# Patient Record
Sex: MALE | Race: WHITE | NOT HISPANIC OR LATINO | URBAN - METROPOLITAN AREA
[De-identification: names, ages, dates, MRNs, and addresses within clinical notes are randomized per-mention and may not be internally consistent; named-entity substitution may affect disease eponyms.]

---

## 2018-11-01 ENCOUNTER — OFFICE (AMBULATORY)
Dept: URBAN - METROPOLITAN AREA PATHOLOGY 4 | Facility: PATHOLOGY | Age: 53
End: 2018-11-01
Payer: COMMERCIAL

## 2018-11-01 ENCOUNTER — ON CAMPUS - OUTPATIENT (AMBULATORY)
Dept: URBAN - METROPOLITAN AREA HOSPITAL 2 | Facility: HOSPITAL | Age: 53
End: 2018-11-01
Payer: COMMERCIAL

## 2018-11-01 VITALS
RESPIRATION RATE: 15 BRPM | HEART RATE: 84 BPM | DIASTOLIC BLOOD PRESSURE: 72 MMHG | HEART RATE: 79 BPM | DIASTOLIC BLOOD PRESSURE: 68 MMHG | SYSTOLIC BLOOD PRESSURE: 131 MMHG | DIASTOLIC BLOOD PRESSURE: 64 MMHG | HEART RATE: 86 BPM | HEART RATE: 83 BPM | WEIGHT: 205 LBS | DIASTOLIC BLOOD PRESSURE: 67 MMHG | DIASTOLIC BLOOD PRESSURE: 84 MMHG | RESPIRATION RATE: 16 BRPM | DIASTOLIC BLOOD PRESSURE: 57 MMHG | TEMPERATURE: 97.2 F | HEART RATE: 77 BPM | HEIGHT: 69 IN | RESPIRATION RATE: 18 BRPM | OXYGEN SATURATION: 94 % | HEART RATE: 90 BPM | SYSTOLIC BLOOD PRESSURE: 104 MMHG | SYSTOLIC BLOOD PRESSURE: 94 MMHG | DIASTOLIC BLOOD PRESSURE: 62 MMHG | RESPIRATION RATE: 20 BRPM | DIASTOLIC BLOOD PRESSURE: 83 MMHG | RESPIRATION RATE: 12 BRPM | DIASTOLIC BLOOD PRESSURE: 104 MMHG | OXYGEN SATURATION: 100 % | OXYGEN SATURATION: 97 % | SYSTOLIC BLOOD PRESSURE: 98 MMHG | HEART RATE: 80 BPM | SYSTOLIC BLOOD PRESSURE: 150 MMHG | SYSTOLIC BLOOD PRESSURE: 105 MMHG | HEART RATE: 93 BPM | SYSTOLIC BLOOD PRESSURE: 108 MMHG | OXYGEN SATURATION: 99 % | DIASTOLIC BLOOD PRESSURE: 66 MMHG | HEART RATE: 82 BPM | SYSTOLIC BLOOD PRESSURE: 110 MMHG | OXYGEN SATURATION: 95 %

## 2018-11-01 DIAGNOSIS — K62.1 RECTAL POLYP: ICD-10-CM

## 2018-11-01 DIAGNOSIS — D12.0 BENIGN NEOPLASM OF CECUM: ICD-10-CM

## 2018-11-01 DIAGNOSIS — Z12.11 ENCOUNTER FOR SCREENING FOR MALIGNANT NEOPLASM OF COLON: ICD-10-CM

## 2018-11-01 DIAGNOSIS — K64.8 OTHER HEMORRHOIDS: ICD-10-CM

## 2018-11-01 DIAGNOSIS — K57.30 DIVERTICULOSIS OF LARGE INTESTINE WITHOUT PERFORATION OR ABS: ICD-10-CM

## 2018-11-01 LAB
GI HISTOLOGY: A. UNSPECIFIED: (no result)
GI HISTOLOGY: B. UNSPECIFIED: (no result)
GI HISTOLOGY: PDF REPORT: (no result)

## 2018-11-01 PROCEDURE — 45380 COLONOSCOPY AND BIOPSY: CPT | Mod: PT | Performed by: INTERNAL MEDICINE

## 2018-11-01 PROCEDURE — 88305 TISSUE EXAM BY PATHOLOGIST: CPT | Mod: 26 | Performed by: INTERNAL MEDICINE

## 2021-07-27 ENCOUNTER — APPOINTMENT (OUTPATIENT)
Dept: GENERAL RADIOLOGY | Facility: HOSPITAL | Age: 56
End: 2021-07-27

## 2021-07-27 ENCOUNTER — HOSPITAL ENCOUNTER (EMERGENCY)
Facility: HOSPITAL | Age: 56
Discharge: HOME | End: 2021-07-27
Attending: EMERGENCY MEDICINE | Admitting: EMERGENCY MEDICINE

## 2021-07-27 VITALS
DIASTOLIC BLOOD PRESSURE: 89 MMHG | HEART RATE: 78 BPM | HEIGHT: 69 IN | TEMPERATURE: 97.9 F | BODY MASS INDEX: 29.03 KG/M2 | SYSTOLIC BLOOD PRESSURE: 148 MMHG | WEIGHT: 196 LBS | OXYGEN SATURATION: 99 % | RESPIRATION RATE: 16 BRPM
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
## 2021-07-27 DIAGNOSIS — S80.12XA CONTUSION OF LEFT LOWER LEG, INITIAL ENCOUNTER: ICD-10-CM

## 2021-07-27 DIAGNOSIS — S40.012A CONTUSION OF LEFT SHOULDER, INITIAL ENCOUNTER: ICD-10-CM

## 2021-07-27 PROCEDURE — 99282 EMERGENCY DEPT VISIT SF MDM: CPT

## 2021-07-27 PROCEDURE — 73010 X-RAY EXAM OF SHOULDER BLADE: CPT

## 2021-07-27 PROCEDURE — 73590 X-RAY EXAM OF LOWER LEG: CPT

## 2021-09-12 ENCOUNTER — INPATIENT HOSPITAL (AMBULATORY)
Dept: URBAN - METROPOLITAN AREA HOSPITAL 76 | Facility: HOSPITAL | Age: 56
End: 2021-09-12

## 2021-09-12 DIAGNOSIS — K21.9 GASTRO-ESOPHAGEAL REFLUX DISEASE WITHOUT ESOPHAGITIS: ICD-10-CM

## 2021-09-12 DIAGNOSIS — R93.3 ABNORMAL FINDINGS ON DIAGNOSTIC IMAGING OF OTHER PARTS OF DI: ICD-10-CM

## 2021-09-12 DIAGNOSIS — R79.89 OTHER SPECIFIED ABNORMAL FINDINGS OF BLOOD CHEMISTRY: ICD-10-CM

## 2021-09-12 PROCEDURE — 99222 1ST HOSP IP/OBS MODERATE 55: CPT | Performed by: INTERNAL MEDICINE

## 2021-09-13 ENCOUNTER — INPATIENT HOSPITAL (AMBULATORY)
Dept: URBAN - METROPOLITAN AREA HOSPITAL 76 | Facility: HOSPITAL | Age: 56
End: 2021-09-13

## 2021-09-13 DIAGNOSIS — Z86.010 PERSONAL HISTORY OF COLONIC POLYPS: ICD-10-CM

## 2021-09-13 DIAGNOSIS — R07.9 CHEST PAIN, UNSPECIFIED: ICD-10-CM

## 2021-09-13 DIAGNOSIS — R93.3 ABNORMAL FINDINGS ON DIAGNOSTIC IMAGING OF OTHER PARTS OF DI: ICD-10-CM

## 2021-09-13 PROCEDURE — 99232 SBSQ HOSP IP/OBS MODERATE 35: CPT | Performed by: NURSE PRACTITIONER

## 2023-11-20 ENCOUNTER — OFFICE (AMBULATORY)
Dept: URBAN - METROPOLITAN AREA CLINIC 64 | Facility: CLINIC | Age: 58
End: 2023-11-20
Payer: MEDICAID

## 2023-11-20 VITALS
HEIGHT: 69 IN | DIASTOLIC BLOOD PRESSURE: 90 MMHG | HEART RATE: 70 BPM | WEIGHT: 177.2 LBS | SYSTOLIC BLOOD PRESSURE: 152 MMHG

## 2023-11-20 DIAGNOSIS — R93.3 ABNORMAL FINDINGS ON DIAGNOSTIC IMAGING OF OTHER PARTS OF DI: ICD-10-CM

## 2023-11-20 DIAGNOSIS — R63.4 ABNORMAL WEIGHT LOSS: ICD-10-CM

## 2023-11-20 DIAGNOSIS — K59.00 CONSTIPATION, UNSPECIFIED: ICD-10-CM

## 2023-11-20 DIAGNOSIS — R63.0 ANOREXIA: ICD-10-CM

## 2023-11-20 DIAGNOSIS — R13.10 DYSPHAGIA, UNSPECIFIED: ICD-10-CM

## 2023-11-20 DIAGNOSIS — Z86.010 PERSONAL HISTORY OF COLONIC POLYPS: ICD-10-CM

## 2023-11-20 DIAGNOSIS — Z79.02 LONG TERM (CURRENT) USE OF ANTITHROMBOTICS/ANTIPLATELETS: ICD-10-CM

## 2023-11-20 PROCEDURE — 99214 OFFICE O/P EST MOD 30 MIN: CPT | Performed by: NURSE PRACTITIONER

## 2024-01-08 ENCOUNTER — ON CAMPUS - OUTPATIENT (AMBULATORY)
Dept: URBAN - METROPOLITAN AREA HOSPITAL 77 | Facility: HOSPITAL | Age: 59
End: 2024-01-08
Payer: MEDICAID

## 2024-01-08 DIAGNOSIS — Z86.010 PERSONAL HISTORY OF COLONIC POLYPS: ICD-10-CM

## 2024-01-08 DIAGNOSIS — R63.4 ABNORMAL WEIGHT LOSS: ICD-10-CM

## 2024-01-08 DIAGNOSIS — K29.50 UNSPECIFIED CHRONIC GASTRITIS WITHOUT BLEEDING: ICD-10-CM

## 2024-01-08 DIAGNOSIS — K63.5 POLYP OF COLON: ICD-10-CM

## 2024-01-08 DIAGNOSIS — R13.10 DYSPHAGIA, UNSPECIFIED: ICD-10-CM

## 2024-01-08 DIAGNOSIS — R93.3 ABNORMAL FINDINGS ON DIAGNOSTIC IMAGING OF OTHER PARTS OF DI: ICD-10-CM

## 2024-01-08 PROCEDURE — 43239 EGD BIOPSY SINGLE/MULTIPLE: CPT | Performed by: INTERNAL MEDICINE

## 2024-01-08 PROCEDURE — 43450 DILATE ESOPHAGUS 1/MULT PASS: CPT | Performed by: INTERNAL MEDICINE

## 2024-01-08 PROCEDURE — 45385 COLONOSCOPY W/LESION REMOVAL: CPT | Performed by: INTERNAL MEDICINE

## 2024-02-20 ENCOUNTER — OFFICE (AMBULATORY)
Dept: URBAN - METROPOLITAN AREA CLINIC 64 | Facility: CLINIC | Age: 59
End: 2024-02-20
Payer: MEDICAID

## 2024-02-20 VITALS
WEIGHT: 186 LBS | DIASTOLIC BLOOD PRESSURE: 79 MMHG | HEIGHT: 69 IN | HEART RATE: 52 BPM | SYSTOLIC BLOOD PRESSURE: 133 MMHG

## 2024-02-20 DIAGNOSIS — R13.10 DYSPHAGIA, UNSPECIFIED: ICD-10-CM

## 2024-02-20 DIAGNOSIS — R63.0 ANOREXIA: ICD-10-CM

## 2024-02-20 PROCEDURE — 99212 OFFICE O/P EST SF 10 MIN: CPT | Performed by: NURSE PRACTITIONER

## 2025-01-08 NOTE — PROGRESS NOTES
Chief Complaint  Memory Loss    Subjective            Kamar Scott presents to [facility] NEUROLOGY for   History of Present Illness    Kamar Scott is a 59-year-old male seen today as a new patient for memory decline, referred by Dr. Lafleur.  He is accompanied by his wife, who provides primary history.  The patient has a baseline of learning disability.  He finished the 10th grade but never learned to read and write beyond a second grade level (per wife).  She reports that he started forgetting small things around the beginning of , but they noticed a significant change in cognition in 2023.  He started forgetting more important things like his name and he became scared to drive.  He saw Dr. Juni Pabon and 2024 who did lab work and started him on donepezil 5 mg daily.  The donepezil seemed to improve his recall and short-term memory slightly.      She does most of the cooking, but he will cook things in the microwave.  He has forgotten to turn off the oven in the past.  He is not driving.  Patient had 1 recent fall due to tripping over their dog.    He has a history of type 2 diabetes; last A1c 6.1%.  He was taken off all of his diabetes medication due to a decrease in kidney function.  He sees a nephrologist.  The patient has lost a significant amount of weight and his type 2 diabetes is more diet controlled.  He has a history of heart surgery at the age of 9, atrial fibrillation, and heart attack with stent placement.    His father had early onset dementia (approximately starting around the same age) and  in his early 60s.        Onset: Early  and getting worse, especially worse around 2023  Medication:donepezil 5 mg-started this medication   Examples: He can't drive or write his name  Family History: Father with early onset dementia,  in his early 60s  Triggers: stress  Example: forgetting places, he use to hunt but unable to do that anymore  Forgets  "what he went to room for        Maximum   Score Patient's   Score Questions   5 3 \"What is the year?Season?Date?Day of the week?Month?\"   5 4 \"Where are we now: State?County?Town/city?Hospital?Floor?\"   3 3 3 Unrelated objects Number of trials:___   5 0 Count backward from 100 by sevens or spell WORLD backwards   3 0 Name 3 things from above   2 2 Identify 2 objects   1 0 Repeat the phrase: No ifs, ands,or buts.   3 0 Take paper in right hand, fold it in half, and put it on the floor.   1 0 Please read this and do what it says. \"Close your eyes\"   1 0 Make up and write a sentence about anything. Noun and verb   1 0 Copy this picture 10 angles must be present.   30 12 Total MMSE                   Family History   Problem Relation Age of Onset    Neuropathy Father     Dementia Father        Past Medical History:   Diagnosis Date    Hypertension     Peripheral neuropathy        Social History     Socioeconomic History    Marital status:    Tobacco Use    Smoking status: Every Day     Types: Cigarettes   Vaping Use    Vaping status: Never Used   Substance and Sexual Activity    Alcohol use: Not Currently    Drug use: Yes     Types: Marijuana    Sexual activity: Defer         Current Outpatient Medications:     aspirin 81 MG EC tablet, Take 1 tablet by mouth Daily., Disp: , Rfl:     atorvastatin (LIPITOR) 10 MG tablet, Take 1 tablet by mouth Daily., Disp: , Rfl:     clopidogrel (PLAVIX) 75 MG tablet, Take 1 tablet by mouth Daily., Disp: , Rfl:     cyclobenzaprine (FLEXERIL) 10 MG tablet, Take 1 tablet by mouth Daily., Disp: , Rfl:     dilTIAZem CD (CARDIZEM CD) 300 MG 24 hr capsule, Take 1 capsule by mouth every night at bedtime., Disp: , Rfl:     donepezil (ARICEPT) 10 MG tablet, Take 0.5 tablets by mouth Every Night., Disp: 30 tablet, Rfl: 5    DULoxetine (CYMBALTA) 60 MG capsule, Take 1 capsule by mouth Daily., Disp: , Rfl:     gabapentin (NEURONTIN) 300 MG capsule, Take 1 capsule by mouth 2 (Two) Times a Day., " "Disp: , Rfl:     gemfibrozil (LOPID) 600 MG tablet, Take 1 tablet by mouth 2 (Two) Times a Day., Disp: , Rfl:     HYDROcodone-acetaminophen (NORCO)  MG per tablet, TAKE 1 TABLET BY MOUTH EVERY 6 HOURS AS NEEDED FOR 30 DAYS, Disp: , Rfl:     lisinopril (PRINIVIL,ZESTRIL) 2.5 MG tablet, Take 1 tablet by mouth Daily., Disp: , Rfl:     metoprolol succinate XL (TOPROL-XL) 100 MG 24 hr tablet, Take 1 tablet by mouth 2 (Two) Times a Day., Disp: , Rfl:     multivitamin with minerals tablet tablet, Take 1 tablet by mouth Daily., Disp: , Rfl:     tamsulosin (FLOMAX) 0.4 MG capsule 24 hr capsule, Take 1 capsule by mouth 2 (Two) Times a Day., Disp: , Rfl:     melatonin 5 MG sublingual tablet sublingual tablet, Place 4 tablets under the tongue., Disp: , Rfl:     Review of Systems   Genitourinary:  Positive for frequency.   Musculoskeletal:  Positive for neck pain and neck stiffness.   Neurological:  Positive for tremors and light-headedness.   Psychiatric/Behavioral:  Positive for agitation and confusion. The patient is nervous/anxious and is hyperactive.    All other systems reviewed and are negative.           Objective   Vital Signs:   /78   Pulse 56   Ht 175.3 cm (69\")   Wt 83.5 kg (184 lb)   BMI 27.17 kg/m²     Physical Exam  Vitals reviewed.   Constitutional:       General: He is awake.      Appearance: He is well-developed and overweight.   HENT:      Head: Normocephalic and atraumatic.   Eyes:      General: Lids are normal.      Extraocular Movements: Extraocular movements intact.      Pupils: Pupils are equal, round, and reactive to light.   Neck:      Vascular: No carotid bruit.   Cardiovascular:      Rate and Rhythm: Normal rate.      Heart sounds: No murmur heard.  Pulmonary:      Effort: Pulmonary effort is normal.   Musculoskeletal:      Cervical back: Normal range of motion and neck supple.   Skin:     General: Skin is warm and dry.      Findings: No rash.   Neurological:      Mental Status: He is " alert and oriented to person, place, and time.      Cranial Nerves: Cranial nerves 2-12 are intact. No cranial nerve deficit.      Sensory: No sensory deficit.      Motor: Motor function is intact. Weakness present. No tremor, atrophy or abnormal muscle tone.      Coordination: Coordination is intact. Finger-Nose-Finger Test normal. Rapid alternating movements normal.      Deep Tendon Reflexes: Reflexes are normal and symmetric.      Reflex Scores:       Tricep reflexes are 2+ on the right side and 2+ on the left side.       Bicep reflexes are 2+ on the right side and 2+ on the left side.       Brachioradialis reflexes are 2+ on the right side and 2+ on the left side.       Patellar reflexes are 2+ on the right side and 2+ on the left side.     Comments: Weakness right shoulder abduction (shoulder surgery x 2)   Psychiatric:         Attention and Perception: Attention normal.         Mood and Affect: Mood normal.         Speech: Speech normal.         Behavior: Behavior normal.         Cognition and Memory: Cognition and memory normal.         Judgment: Judgment normal.        Result Review :              CT HEAD W/O 1-  IMPRESSION:  No acute intracranial process identified.   Electronically Signed: Scooter Isidro MD      Neurological Exam  Mental Status  Awake and alert. Oriented only to person. Oriented to person, place, and time. Memory is normal. Speech is normal. MMSE score: 12.  Patient has baseline learning disabilities, pleated the 10th grade, unable to read or write above second grade level per wife.    Cranial Nerves  CN II: Visual acuity is normal. Visual fields full to confrontation.  CN III, IV, VI: Extraocular movements intact bilaterally. Normal lids and orbits bilaterally. Pupils equal round and reactive to light bilaterally.  CN V: Facial sensation is normal.  CN VII: Full and symmetric facial movement.  CN IX, X: Palate elevates symmetrically. Normal gag reflex.  CN XI: Shoulder shrug strength  is normal.  CN XII: Tongue midline without atrophy or fasciculations.    Motor   Normal muscle tone. No abnormal involuntary movements.                                               Right                     Left   Shoulder abduction               5                          5  Elbow flexion                         5                          5  Elbow extension                    5                          5  Hip flexion                              5                          5  Knee extension                      5                          5    Sensory  Light touch is normal in upper and lower extremities.     Reflexes  Deep tendon reflexes are 2+ and symmetric in all four extremities.                                            Right                      Left  Brachioradialis                    2+                         2+  Biceps                                 2+                         2+  Triceps                                2+                         2+  Patellar                                2+                         2+    Coordination    Finger-to-nose, rapid alternating movements and heel-to-shin normal bilaterally without dysmetria.No tremor    Gait  Casual gait is normal including stance, stride, and arm swing. Able to rise from chair without using arms.              Assessment and Plan    Diagnoses and all orders for this visit:    1. 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  Dispense: 30 tablet; Refill: 5  -     Cancel: IR Lumbar Puncture Diagnosis; Future  -     Obtain Informed Consent; Standing  -     Notify Provider if Patient Taking Blood Thinning Agents; Standing  -     Check & Record Opening & Closing Pressures (LP); Standing  -     CBC (No Diff); Standing  -     Glucose, CSF - Cerebrospinal Fluid, Lumbar Puncture; Standing  -     Protein, CSF - Cerebrospinal Fluid, Lumbar Puncture; Standing  -     Cell Count With Differential, CSF Use CSF Tube: 1; Standing  -     Cell Count With Differential, CSF; Standing  -     CSF Tube - Cerebrospinal Fluid, Lumbar Puncture; Standing  -     CSF Tube - Cerebrospinal Fluid, Lumbar Puncture; Standing  -     Beta Amyloid 42/40 Ratio, CSF (Sarstedt tube) - Cerebrospinal Fluid, Lumbar Puncture; Standing  -     Mika Cunningham Virus DNA, PCR CSF - Cerebrospinal Fluid, Lumbar Puncture; Standing  -     CSF phoshorylated ; - Miscellaneous Test; Standing  -     Non-gynecologic Cytology; Standing  -     Flow Cytometry; Standing  -     AB42/40 plasma - Miscellaneous Test; Future  -     Creutzfeldt-Anthony Disease (Collect Sarstedt tube) - Cerebrospinal Fluid, Lumbar Puncture; Future  -     IR Lumbar Puncture Diagnosis; Future    2. Memory loss  -     HIV-1 & HIV-2 Antibodies; Future  -     Vitamin B12 & Folate  -     Copper, Serum; Future  -     Heavy Metals, Blood  -     TSH+Free T4  -     Lyme Disease Total Antibody With Reflex to Immunoassay; Future  -     Vitamin E; Future  -     Ceruloplasmin; Future  -     EV by IFA, Reflex 9-biomarkers profile; Future  -     MRI Brain With & Without Contrast; Future  -     Sjogren's Antibody, Anti-SS-A / -SS-B; Future  -     Cancel: IR Lumbar Puncture Diagnosis; Future  -     Obtain Informed Consent; Standing  -     Notify Provider if Patient Taking Blood Thinning Agents; Standing  -     Check & Record Opening & Closing Pressures (LP); Standing  -      CBC (No Diff); Standing  -     Glucose, CSF - Cerebrospinal Fluid, Lumbar Puncture; Standing  -     Protein, CSF - Cerebrospinal Fluid, Lumbar Puncture; Standing  -     Cell Count With Differential, CSF Use CSF Tube: 1; Standing  -     Cell Count With Differential, CSF; Standing  -     CSF Tube - Cerebrospinal Fluid, Lumbar Puncture; Standing  -     CSF Tube - Cerebrospinal Fluid, Lumbar Puncture; Standing  -     Beta Amyloid 42/40 Ratio, CSF (Sarstedt tube) - Cerebrospinal Fluid, Lumbar Puncture; Standing  -     Mika Cunningham Virus DNA, PCR CSF - Cerebrospinal Fluid, Lumbar Puncture; Standing  -     CSF phoshorylated ; - Miscellaneous Test; Standing  -     Non-gynecologic Cytology; Standing  -     Flow Cytometry; Standing  -     AB42/40 plasma - Miscellaneous Test; Future  -     IR Lumbar Puncture Diagnosis; Future      Kamar Scott is seen today as a new patient for memory decline.  Symptoms started about a year and a half ago and had significantly declined since las summer.  There was a noticeable improvement in memory and recall when starting donepezil 5 mg daily.  He had a CT of his head 1/20/2024 which was unremarkable.  Lab testing completed through another neurology office indicated low normal B12 level otherwise levels WNL.  MMSE today was 12/30 down from 18/30 in June.  Testing is inconsistent due to baseline cognitive function.    We discussed environmental versus genetic risk factors for early onset dementia/Alzheimer's.  He has history of type 2 diabetes, high cholesterol, and hypertension.    Will check MRI brain with and without contrast    Additional labs for causes or contributing factors of memory and cognitive decline    We discussed getting a lumbar puncture for rapid progression of early onset dementia.  The patient and his wife are interested in pursuing this.  We can check AB42/40 along with additional testing including for CJD    Increase donepezil to 10 mg daily    Follow-up  after testing      I spent 96 minutes caring for Kamar on this date of service. This time includes time spent by me in the following activities:preparing for the visit, reviewing tests, obtaining and/or reviewing a separately obtained history, performing a medically appropriate examination and/or evaluation , counseling and educating the patient/family/caregiver, ordering medications, tests, or procedures, and documenting information in the medical record  Follow Up   Return for Next scheduled follow up afer testing.  Patient was given instructions and counseling regarding his condition or for health maintenance advice. Please see specific information pulled into the AVS if appropriate.         This document has been electronically signed by ALF Gonzales on January 9, 2025 16:39 EST

## 2025-01-09 ENCOUNTER — OFFICE VISIT (OUTPATIENT)
Dept: NEUROLOGY | Facility: CLINIC | Age: 60
End: 2025-01-09
Payer: MEDICARE

## 2025-01-09 VITALS
BODY MASS INDEX: 27.25 KG/M2 | SYSTOLIC BLOOD PRESSURE: 130 MMHG | DIASTOLIC BLOOD PRESSURE: 78 MMHG | HEIGHT: 69 IN | HEART RATE: 56 BPM | WEIGHT: 184 LBS

## 2025-01-09 DIAGNOSIS — R41.3 MEMORY LOSS: ICD-10-CM

## 2025-01-09 DIAGNOSIS — F02.B0 MODERATE EARLY ONSET ALZHEIMER'S DEMENTIA WITHOUT BEHAVIORAL DISTURBANCE, PSYCHOTIC DISTURBANCE, MOOD DISTURBANCE, OR ANXIETY: Primary | ICD-10-CM

## 2025-01-09 DIAGNOSIS — G30.0 MODERATE EARLY ONSET ALZHEIMER'S DEMENTIA WITHOUT BEHAVIORAL DISTURBANCE, PSYCHOTIC DISTURBANCE, MOOD DISTURBANCE, OR ANXIETY: Primary | ICD-10-CM

## 2025-01-09 RX ORDER — DONEPEZIL HYDROCHLORIDE 5 MG/1
5 TABLET, FILM COATED ORAL NIGHTLY
COMMUNITY
End: 2025-01-09 | Stop reason: SDUPTHER

## 2025-01-09 RX ORDER — GEMFIBROZIL 600 MG/1
600 TABLET, FILM COATED ORAL 2 TIMES DAILY
COMMUNITY

## 2025-01-09 RX ORDER — CLOPIDOGREL BISULFATE 75 MG/1
75 TABLET ORAL DAILY
COMMUNITY

## 2025-01-09 RX ORDER — HYDROCODONE BITARTRATE AND ACETAMINOPHEN 10; 325 MG/1; MG/1
TABLET ORAL
COMMUNITY

## 2025-01-09 RX ORDER — CYCLOBENZAPRINE HCL 10 MG
10 TABLET ORAL DAILY
COMMUNITY

## 2025-01-09 RX ORDER — ACETAMINOPHEN 160 MG
TABLET,DISINTEGRATING ORAL
COMMUNITY
Start: 2015-06-01 | End: 2025-01-09

## 2025-01-09 RX ORDER — ATENOLOL 25 MG/1
TABLET ORAL
COMMUNITY
Start: 2014-04-18 | End: 2025-01-09

## 2025-01-09 RX ORDER — METOPROLOL SUCCINATE 100 MG/1
100 TABLET, EXTENDED RELEASE ORAL 2 TIMES DAILY
COMMUNITY

## 2025-01-09 RX ORDER — DULOXETIN HYDROCHLORIDE 60 MG/1
60 CAPSULE, DELAYED RELEASE ORAL DAILY
COMMUNITY

## 2025-01-09 RX ORDER — DILTIAZEM HYDROCHLORIDE 300 MG/1
300 CAPSULE, COATED, EXTENDED RELEASE ORAL
COMMUNITY
Start: 2024-10-16

## 2025-01-09 RX ORDER — MELOXICAM 15 MG/1
1 TABLET ORAL DAILY
COMMUNITY
End: 2025-01-09

## 2025-01-09 RX ORDER — TAMSULOSIN HYDROCHLORIDE 0.4 MG/1
1 CAPSULE ORAL 2 TIMES DAILY
COMMUNITY

## 2025-01-09 RX ORDER — GLIPIZIDE 10 MG/1
TABLET ORAL
COMMUNITY
End: 2025-01-09

## 2025-01-09 RX ORDER — GABAPENTIN 300 MG/1
300 CAPSULE ORAL 2 TIMES DAILY
COMMUNITY

## 2025-01-09 RX ORDER — LISINOPRIL 2.5 MG/1
2.5 TABLET ORAL DAILY
COMMUNITY

## 2025-01-09 RX ORDER — ASPIRIN 81 MG/1
1 TABLET ORAL DAILY
COMMUNITY
Start: 2014-04-18

## 2025-01-09 RX ORDER — LISINOPRIL AND HYDROCHLOROTHIAZIDE 20; 25 MG/1; MG/1
TABLET ORAL
COMMUNITY
Start: 2014-04-18 | End: 2025-01-09

## 2025-01-09 RX ORDER — CITALOPRAM HYDROBROMIDE 40 MG/1
TABLET ORAL
COMMUNITY
Start: 2015-04-28 | End: 2025-01-09

## 2025-01-09 RX ORDER — MULTIVIT-MIN/IRON FUM/FOLIC AC 7.5 MG-4
1 TABLET ORAL DAILY
COMMUNITY

## 2025-01-09 RX ORDER — ATORVASTATIN CALCIUM 10 MG/1
1 TABLET, FILM COATED ORAL DAILY
COMMUNITY

## 2025-01-09 RX ORDER — DONEPEZIL HYDROCHLORIDE 10 MG/1
5 TABLET, FILM COATED ORAL NIGHTLY
Qty: 30 TABLET | Refills: 5 | Status: SHIPPED | OUTPATIENT
Start: 2025-01-09

## 2025-02-03 ENCOUNTER — HOSPITAL ENCOUNTER (OUTPATIENT)
Dept: MRI IMAGING | Facility: HOSPITAL | Age: 60
Discharge: HOME | End: 2025-02-03
Admitting: NURSE PRACTITIONER
Payer: MEDICARE

## 2025-02-03 DIAGNOSIS — G30.0 MODERATE EARLY ONSET ALZHEIMER'S DEMENTIA WITHOUT BEHAVIORAL DISTURBANCE, PSYCHOTIC DISTURBANCE, MOOD DISTURBANCE, OR ANXIETY: ICD-10-CM

## 2025-02-03 DIAGNOSIS — R41.3 MEMORY LOSS: ICD-10-CM

## 2025-02-03 DIAGNOSIS — F02.B0 MODERATE EARLY ONSET ALZHEIMER'S DEMENTIA WITHOUT BEHAVIORAL DISTURBANCE, PSYCHOTIC DISTURBANCE, MOOD DISTURBANCE, OR ANXIETY: ICD-10-CM

## 2025-02-03 LAB
CREAT BLDA-MCNC: 1.8 MG/DL (ref 0.6–1.3)
EGFRCR SERPLBLD CKD-EPI 2021: 43.4 ML/MIN/1.73

## 2025-02-03 PROCEDURE — 82565 ASSAY OF CREATININE: CPT

## 2025-02-03 PROCEDURE — A9579 GAD-BASE MR CONTRAST NOS,1ML: HCPCS | Performed by: NURSE PRACTITIONER

## 2025-02-03 PROCEDURE — 25010000002 GADOTERIDOL PER 1 ML: Performed by: NURSE PRACTITIONER

## 2025-02-03 PROCEDURE — 70553 MRI BRAIN STEM W/O & W/DYE: CPT

## 2025-02-03 RX ADMIN — GADOTERIDOL 20 ML: 279.3 INJECTION, SOLUTION INTRAVENOUS at 08:34

## 2025-02-12 ENCOUNTER — HOSPITAL ENCOUNTER (OUTPATIENT)
Dept: INTERVENTIONAL RADIOLOGY/VASCULAR | Facility: HOSPITAL | Age: 60
Discharge: HOME | End: 2025-02-12
Payer: MEDICARE

## 2025-02-12 VITALS
OXYGEN SATURATION: 94 % | SYSTOLIC BLOOD PRESSURE: 127 MMHG | HEART RATE: 57 BPM | BODY MASS INDEX: 27.25 KG/M2 | WEIGHT: 184 LBS | RESPIRATION RATE: 14 BRPM | TEMPERATURE: 97.9 F | HEIGHT: 69 IN | DIASTOLIC BLOOD PRESSURE: 78 MMHG

## 2025-02-12 DIAGNOSIS — G30.0 MODERATE EARLY ONSET ALZHEIMER'S DEMENTIA WITHOUT BEHAVIORAL DISTURBANCE, PSYCHOTIC DISTURBANCE, MOOD DISTURBANCE, OR ANXIETY: ICD-10-CM

## 2025-02-12 DIAGNOSIS — R41.3 MEMORY LOSS: ICD-10-CM

## 2025-02-12 DIAGNOSIS — F02.B0 MODERATE EARLY ONSET ALZHEIMER'S DEMENTIA WITHOUT BEHAVIORAL DISTURBANCE, PSYCHOTIC DISTURBANCE, MOOD DISTURBANCE, OR ANXIETY: ICD-10-CM

## 2025-02-12 LAB
APPEARANCE CSF: CLEAR
APTT PPP: 31.6 SECONDS (ref 22.7–35.4)
BASOPHILS # BLD AUTO: 0.06 10*3/MM3 (ref 0–0.2)
BASOPHILS NFR BLD AUTO: 0.8 % (ref 0–1.5)
CERULOPLASMIN SERPL-MCNC: 24 MG/DL (ref 16–31)
COLOR CSF: COLORLESS
DEPRECATED RDW RBC AUTO: 43.9 FL (ref 37–54)
EOSINOPHIL # BLD AUTO: 1.38 10*3/MM3 (ref 0–0.4)
EOSINOPHIL NFR BLD AUTO: 18.2 % (ref 0.3–6.2)
ERYTHROCYTE [DISTWIDTH] IN BLOOD BY AUTOMATED COUNT: 12.6 % (ref 12.3–15.4)
FOLATE SERPL-MCNC: 20 NG/ML (ref 4.78–24.2)
GLUCOSE CSF-MCNC: 85 MG/DL (ref 40–70)
HCT VFR BLD AUTO: 40.8 % (ref 37.5–51)
HGB BLD-MCNC: 13.5 G/DL (ref 13–17.7)
HIV 1+2 AB+HIV1 P24 AG SERPL QL IA: NORMAL
HOLD SPECIMEN: NORMAL
IMM GRANULOCYTES # BLD AUTO: 0.02 10*3/MM3 (ref 0–0.05)
IMM GRANULOCYTES NFR BLD AUTO: 0.3 % (ref 0–0.5)
INR PPP: 0.98 (ref 0.9–1.1)
LYMPHOCYTES # BLD AUTO: 2.18 10*3/MM3 (ref 0.7–3.1)
LYMPHOCYTES NFR BLD AUTO: 28.7 % (ref 19.6–45.3)
MCH RBC QN AUTO: 31.3 PG (ref 26.6–33)
MCHC RBC AUTO-ENTMCNC: 33.1 G/DL (ref 31.5–35.7)
MCV RBC AUTO: 94.4 FL (ref 79–97)
METHOD: ABNORMAL
MONOCYTES # BLD AUTO: 0.59 10*3/MM3 (ref 0.1–0.9)
MONOCYTES NFR BLD AUTO: 7.8 % (ref 5–12)
NEUTROPHILS NFR BLD AUTO: 3.36 10*3/MM3 (ref 1.7–7)
NEUTROPHILS NFR BLD AUTO: 44.2 % (ref 42.7–76)
NRBC BLD AUTO-RTO: 0 /100 WBC (ref 0–0.2)
NUC CELL # CSF MANUAL: 0 /MM3 (ref 0–5)
PLATELET # BLD AUTO: 183 10*3/MM3 (ref 140–450)
PMV BLD AUTO: 9.9 FL (ref 6–12)
PROT CSF-MCNC: 65.7 MG/DL (ref 15–45)
PROTHROMBIN TIME: 13.1 SECONDS (ref 11.7–14.2)
RBC # BLD AUTO: 4.32 10*6/MM3 (ref 4.14–5.8)
RBC # CSF MANUAL: 8 /MM3 (ref 0–5)
SPECIMEN VOL CSF: 2 ML
T4 FREE SERPL-MCNC: 0.87 NG/DL (ref 0.92–1.68)
TSH SERPL DL<=0.05 MIU/L-ACNC: 2.38 UIU/ML (ref 0.27–4.2)
TUBE # CSF: 3
VIT B12 BLD-MCNC: 505 PG/ML (ref 211–946)
WBC NRBC COR # BLD AUTO: 7.59 10*3/MM3 (ref 3.4–10.8)

## 2025-02-12 PROCEDURE — 25010000002 LIDOCAINE 1 % SOLUTION: Performed by: RADIOLOGY

## 2025-02-12 PROCEDURE — 87798 DETECT AGENT NOS DNA AMP: CPT | Performed by: NURSE PRACTITIONER

## 2025-02-12 PROCEDURE — 25010000002 FENTANYL CITRATE (PF) 50 MCG/ML SOLUTION: Performed by: RADIOLOGY

## 2025-02-12 PROCEDURE — 86038 ANTINUCLEAR ANTIBODIES: CPT | Performed by: NURSE PRACTITIONER

## 2025-02-12 PROCEDURE — 85025 COMPLETE CBC W/AUTO DIFF WBC: CPT | Performed by: RADIOLOGY

## 2025-02-12 PROCEDURE — 82945 GLUCOSE OTHER FLUID: CPT | Performed by: NURSE PRACTITIONER

## 2025-02-12 PROCEDURE — 86235 NUCLEAR ANTIGEN ANTIBODY: CPT | Performed by: NURSE PRACTITIONER

## 2025-02-12 PROCEDURE — 89050 BODY FLUID CELL COUNT: CPT | Performed by: NURSE PRACTITIONER

## 2025-02-12 PROCEDURE — 36415 COLL VENOUS BLD VENIPUNCTURE: CPT | Performed by: NURSE PRACTITIONER

## 2025-02-12 PROCEDURE — 82525 ASSAY OF COPPER: CPT | Performed by: NURSE PRACTITIONER

## 2025-02-12 PROCEDURE — 84446 ASSAY OF VITAMIN E: CPT | Performed by: NURSE PRACTITIONER

## 2025-02-12 PROCEDURE — 82746 ASSAY OF FOLIC ACID SERUM: CPT | Performed by: NURSE PRACTITIONER

## 2025-02-12 PROCEDURE — 85730 THROMBOPLASTIN TIME PARTIAL: CPT | Performed by: RADIOLOGY

## 2025-02-12 PROCEDURE — 84157 ASSAY OF PROTEIN OTHER: CPT | Performed by: NURSE PRACTITIONER

## 2025-02-12 PROCEDURE — 85610 PROTHROMBIN TIME: CPT | Performed by: RADIOLOGY

## 2025-02-12 PROCEDURE — 84439 ASSAY OF FREE THYROXINE: CPT | Performed by: NURSE PRACTITIONER

## 2025-02-12 PROCEDURE — 84443 ASSAY THYROID STIM HORMONE: CPT | Performed by: NURSE PRACTITIONER

## 2025-02-12 PROCEDURE — 82390 ASSAY OF CERULOPLASMIN: CPT | Performed by: NURSE PRACTITIONER

## 2025-02-12 PROCEDURE — 82607 VITAMIN B-12: CPT | Performed by: NURSE PRACTITIONER

## 2025-02-12 PROCEDURE — G0432 EIA HIV-1/HIV-2 SCREEN: HCPCS | Performed by: NURSE PRACTITIONER

## 2025-02-12 PROCEDURE — 88108 CYTOPATH CONCENTRATE TECH: CPT | Performed by: NURSE PRACTITIONER

## 2025-02-12 PROCEDURE — 86618 LYME DISEASE ANTIBODY: CPT | Performed by: NURSE PRACTITIONER

## 2025-02-12 PROCEDURE — 83520 IMMUNOASSAY QUANT NOS NONAB: CPT | Performed by: NURSE PRACTITIONER

## 2025-02-12 RX ORDER — FENTANYL CITRATE 50 UG/ML
INJECTION, SOLUTION INTRAMUSCULAR; INTRAVENOUS AS NEEDED
Status: COMPLETED | OUTPATIENT
Start: 2025-02-12 | End: 2025-02-12

## 2025-02-12 RX ORDER — SODIUM CHLORIDE 0.9 % (FLUSH) 0.9 %
10 SYRINGE (ML) INJECTION AS NEEDED
Status: DISCONTINUED | OUTPATIENT
Start: 2025-02-12 | End: 2025-02-13 | Stop reason: HOSPADM

## 2025-02-12 RX ORDER — SODIUM CHLORIDE 0.9 % (FLUSH) 0.9 %
10 SYRINGE (ML) INJECTION EVERY 12 HOURS SCHEDULED
Status: DISCONTINUED | OUTPATIENT
Start: 2025-02-12 | End: 2025-02-13 | Stop reason: HOSPADM

## 2025-02-12 RX ORDER — LIDOCAINE HYDROCHLORIDE 10 MG/ML
INJECTION, SOLUTION INFILTRATION; PERINEURAL AS NEEDED
Status: COMPLETED | OUTPATIENT
Start: 2025-02-12 | End: 2025-02-12

## 2025-02-12 RX ADMIN — LIDOCAINE HYDROCHLORIDE 10 ML: 10 INJECTION, SOLUTION INFILTRATION; PERINEURAL at 10:08

## 2025-02-12 RX ADMIN — FENTANYL CITRATE 100 MCG: 50 INJECTION, SOLUTION INTRAMUSCULAR; INTRAVENOUS at 10:22

## 2025-02-12 RX ADMIN — Medication 10 ML: at 08:17

## 2025-02-12 NOTE — NURSING NOTE
Pt discharged home with wife after instructions given and questions answered.  Transported via wheelchair to car and assisted into car without incidence.

## 2025-02-13 LAB
B BURGDOR IGG+IGM SER QL IA: NEGATIVE
ENA SS-A AB SER-ACNC: 0.5 AI (ref 0–0.9)
ENA SS-B AB SER-ACNC: <0.2 AI (ref 0–0.9)

## 2025-02-14 LAB
JCPYV DNA CSF QL NAA+PROBE: NEGATIVE
LAB AP CASE REPORT: NORMAL
LAB AP FLOW CYTOMETRY SUMMARY: NORMAL
PATH REPORT.FINAL DX SPEC: NORMAL
PATH REPORT.GROSS SPEC: NORMAL

## 2025-02-15 LAB
ANA SER QL IF: NEGATIVE
LABORATORY COMMENT REPORT: NORMAL

## 2025-02-17 LAB
COPPER SERPL-MCNC: 99 UG/DL (ref 69–132)
REF LAB TEST RESULTS: NORMAL

## 2025-02-18 LAB
A-TOCOPHEROL VIT E SERPL-MCNC: 18 MG/L (ref 7–25.1)
GAMMA TOCOPHEROL SERPL-MCNC: 0.7 MG/L (ref 0.5–5.5)

## 2025-02-19 LAB
LABORATORY COMMENT REPORT: ABNORMAL
P-TAU217: 1.37 PG/ML (ref 0–0.18)

## 2025-02-20 ENCOUNTER — DOCUMENTATION (OUTPATIENT)
Dept: NEUROLOGY | Facility: CLINIC | Age: 60
End: 2025-02-20
Payer: MEDICARE

## 2025-02-20 DIAGNOSIS — G30.9 ALZHEIMER DISEASE: Primary | ICD-10-CM

## 2025-02-20 DIAGNOSIS — F02.80 ALZHEIMER DISEASE: Primary | ICD-10-CM

## 2025-02-20 LAB — REF LAB TEST RESULTS: NORMAL

## 2025-02-20 NOTE — PROGRESS NOTES
Talked with patient's wife regarding CSF and lab testing.  The patient does have a positive beta amyloid 42/40 CSF with a ratio of 0.051 and a borderline normal amyloid 42/40 blood.  Other testing was within normal limits other than an elevated protein and glucose level.  The patient does have a history of diabetes that had been well-controlled.  I recommend he follow-up with his PCP     Will refer patient to Dr. Parada at [facility] for possible infusion for AD

## 2025-02-24 LAB
IMMUNOLOGIST REVIEW: NORMAL
Lab: 9 RATIO
Lab: NEGATIVE
P-TAU181 CSF IA-MCNC: 26.4 PG/ML
TAU PROT CSF-MCNC: 245 PG/ML

## 2025-04-15 NOTE — PROGRESS NOTES
"Chief Complaint  Memory Loss    Subjective          Kamar Scott presents to [facility] for memory  History of Present Illness    Kamar Scott is a 60-year-old male seen today in follow-up for memory decline diagnosed as Alzheimer's.  Patient has a baseline learning disability and completed the 10th grade but never learned to read and write beyond the second grade level.  In 2023, his wife began noticing short-term memory issues, but in 2024 he started having more difficulty with things like remembering his name and became scared to drive.  MMSE at time of last visit 1/9/2025 was 12/30.  After extensive testing, results were discussed with the patient's wife over the phone and a referral was made to Dr. Parada.  Patient is currently taking donepezil 10 mg daily without any side effects.  She believes that with the increased dose of donepezil he seems more alert and does not sleep all the time.  He had some agitation and trouble sleeping and was started on on risperidone.  He is doing much better on this medication and they deny  him having any side effects.     Patient denies having any visual or auditory hallucinations.  No new safety concerns.  No new complaints today            Maximum   Score Patient's   Score Questions   5 2 \"What is the year?Season?Date?Day of the week?Month?\"   5 2 \"Where are we now: State?County?Town/city?Hospital?Floor?\"   3 3 3 Unrelated objects Number of trials:___   5 0 Count backward from 100 by sevens or spell WORLD backwards   3 0 Name 3 things from above   2 2 Identify 2 objects   1 1 Repeat the phrase: No ifs, ands,or buts.   3 0 Take paper in right hand, fold it in half, and put it on the floor.   1 0 Please read this and do what it says. \"Close your eyes\"   1 0 Make up and write a sentence about anything. Noun and verb   1 0 Copy this picture 10 angles must be present.   30 10 Total MMSE      *MMSE validity  due to inability to read and " write.    ========================================================================  1-9-2025 Irmavipul pryor APRN Office Visit  Assessment and Plan    Diagnoses and all orders for this visit:     1. Moderate early onset Alzheimer's dementia without behavioral disturbance, psychotic disturbance, mood disturbance, or anxiety (Primary)  -          2. Memory loss  -Kamar Scott is seen today as a new patient for memory decline.  Symptoms started about a year and a half ago and had significantly declined since las summer.  There was a noticeable improvement in memory and recall when starting donepezil 5 mg daily.  He had a CT of his head 1/20/2024 which was unremarkable.  Lab testing completed through another neurology office indicated low normal B12 level otherwise levels WNL.  MMSE today was 12/30 down from 18/30 in June.  Testing is inconsistent due to baseline cognitive function.     We discussed environmental versus genetic risk factors for early onset dementia/Alzheimer's.  He has history of type 2 diabetes, high cholesterol, and hypertension.     Will check MRI brain with and without contrast     Additional labs for causes or contributing factors of memory and cognitive decline     We discussed getting a lumbar puncture for rapid progression of early onset dementia.  The patient and his wife are interested in pursuing this.  We can check AB42/40 along with additional testing including for CJD     Increase donepezil to 10 mg daily     Follow-up after testing    Current Outpatient Medications:     apixaban (ELIQUIS) 5 MG tablet tablet, Take 1 tablet by mouth., Disp: , Rfl:     aspirin 81 MG EC tablet, Take 1 tablet by mouth Daily., Disp: , Rfl:     atorvastatin (LIPITOR) 10 MG tablet, Take 1 tablet by mouth Daily., Disp: , Rfl:     clopidogrel (PLAVIX) 75 MG tablet, Take 1 tablet by mouth Daily., Disp: , Rfl:     dilTIAZem CD (CARDIZEM CD) 300 MG 24 hr capsule, Take 1 capsule by mouth every night at bedtime., Disp:  ", Rfl:     donepezil (ARICEPT) 10 MG tablet, Take 0.5 tablets by mouth Every Night., Disp: 30 tablet, Rfl: 5    DULoxetine (CYMBALTA) 60 MG capsule, Take 1 capsule by mouth Daily., Disp: , Rfl:     gabapentin (NEURONTIN) 300 MG capsule, Take 1 capsule by mouth 2 (Two) Times a Day., Disp: , Rfl:     gemfibrozil (LOPID) 600 MG tablet, Take 1 tablet by mouth 2 (Two) Times a Day., Disp: , Rfl:     Gemtesa 75 MG tablet, Take 1 tablet by mouth Daily., Disp: , Rfl:     HYDROcodone-acetaminophen (NORCO)  MG per tablet, TAKE 1 TABLET BY MOUTH EVERY 6 HOURS AS NEEDED FOR 30 DAYS, Disp: , Rfl:     lisinopril (PRINIVIL,ZESTRIL) 2.5 MG tablet, Take 1 tablet by mouth Daily., Disp: , Rfl:     LORazepam (ATIVAN) 1 MG tablet, TAKE 1 TABLET EVERY DAY BY MOUTH AS NEEDED FOR 15 DAYS, FOR ANXIETY., Disp: , Rfl:     melatonin 5 MG sublingual tablet sublingual tablet, Place 4 tablets under the tongue., Disp: , Rfl:     metoprolol succinate XL (TOPROL-XL) 100 MG 24 hr tablet, Take 1 tablet by mouth 2 (Two) Times a Day., Disp: , Rfl:     multivitamin with minerals tablet tablet, Take 1 tablet by mouth Daily., Disp: , Rfl:     risperiDONE (risperDAL) 0.5 MG tablet, take 1 tablet by mouth twice a day as directed, Disp: , Rfl:     tamsulosin (FLOMAX) 0.4 MG capsule 24 hr capsule, Take 1 capsule by mouth 2 (Two) Times a Day., Disp: , Rfl:     memantine (NAMENDA) 5 MG tablet, Take 1 tablet by mouth 2 (Two) Times a Day., Disp: 60 tablet, Rfl: 2    Review of Systems   All other systems reviewed and are negative.         Objective:    Vital Signs:   /71   Pulse 54   Ht 175.3 cm (69\")   Wt 83.9 kg (185 lb)   BMI 27.32 kg/m²     Physical Exam  Vitals reviewed.   Constitutional:       Appearance: He is well-developed and overweight.   HENT:      Head: Normocephalic and atraumatic.      Mouth/Throat:      Mouth: Mucous membranes are dry.   Eyes:      General: Lids are normal.      Extraocular Movements: Extraocular movements intact.     "  Pupils: Pupils are equal, round, and reactive to light.   Cardiovascular:      Rate and Rhythm: Normal rate.      Heart sounds: No murmur heard.  Pulmonary:      Effort: Pulmonary effort is normal.   Musculoskeletal:      Cervical back: Normal range of motion and neck supple.   Skin:     General: Skin is warm and dry.      Findings: No rash.   Neurological:      Mental Status: He is alert and oriented to person, place, and time.      Cranial Nerves: Cranial nerves 2-12 are intact. No cranial nerve deficit.      Motor: Motor function is intact. No tremor.      Coordination: Finger-Nose-Finger Test normal. Rapid alternating movements normal.      Gait: Gait normal.   Psychiatric:         Attention and Perception: Attention normal.         Mood and Affect: Mood normal.         Speech: Speech normal.         Behavior: Behavior normal.         Cognition and Memory: Cognition normal. Memory is impaired. He exhibits impaired recent memory.         Judgment: Judgment normal.        Result Review :              IR LUMBAR PUNCTURE DIAGNOSTIC   Date of Exam: 2/12/2025 9:42 AM EST   Indication: Dementia work-up.   Impression:  Technically successful fluoroscopic guided diagnostic lumbar puncture procedure as described in detail above.    Electronically Signed: Mega Singleton MD    2/12/2025 11:07 AM EST    MRI BRAIN W WO CONTRAST   Date of Exam: 2/3/2025 8:33 AM EST   Indication: memory loss/ early onset dementia.   Impression:  Stable essentially normal contrast-enhanced MRI of the brain.     Electronically Signed: Darío Coronado MD    2/4/2025 9:01 AM EST     Beta amyloid 42/40 ratio 2/12/2025 blood  Beta-amyloid 42/40 Ratio A, 01 0.103 >0.102  Beta-amyloid 42 A, 01 24.94 pg/mL  Beta-amyloid 40 A, 01 242.19          Neurological Exam  Mental Status  Alert. Oriented to person, place, and time. Speech is normal. MMSE score: 10.  Patient is unable to read and write at baseline..    Cranial Nerves  CN II: Visual acuity is normal.  Visual fields full to confrontation.  CN III, IV, VI: Extraocular movements intact bilaterally. Normal lids and orbits bilaterally. Pupils equal round and reactive to light bilaterally.  CN V: Facial sensation is normal.  CN VII: Full and symmetric facial movement.  CN IX, X: Palate elevates symmetrically. Normal gag reflex.  CN XI: Shoulder shrug strength is normal.  CN XII: Tongue midline without atrophy or fasciculations.    Motor   Normal muscle tone. No abnormal involuntary movements. No pronator drift.    Sensory  Light touch is normal in upper and lower extremities.     Coordination  No tremorRight: Finger-to-nose normal. Rapid alternating movement normal.Left: Finger-to-nose normal. Rapid alternating movement normal.    Gait   Normal gait.Casual gait is normal including stance, stride, and arm swing.         Assessment and Plan    Diagnoses and all orders for this visit:    1. Alzheimer disease (Primary)  -     memantine (NAMENDA) 5 MG tablet; Take 1 tablet by mouth 2 (Two) Times a Day.  Dispense: 60 tablet; Refill: 2      Kamar Scott is seen today in follow-up for memory decline,  likely early onset Alzheimer's disease.  Because of his age, patient had extensive testing to rule out other causes.  Beta amyloid 42/40 CSF indicated low ratio and therefore consistent with AD.  He has been referred to Dr. Parada and is scheduled to see him 5/13/2025.    MMSE today is 10/30 down from 12/30 in January.  The patient is unable to read and write beyond a second grade level and therefore MMSE is invalid.    Patient is doing well on donepezil 10 mg daily and wife feels he is more alert and sleeps better with this medication.  Continue donepezil 10 mg daily    Start memantine 5 mg twice daily.    Follow-up as needed at this time.  They are going to contact me after you have seen Dr. Parada to see about continuing to follow-up here or with them.         Follow Up   Return if symptoms worsen or fail to improve.  Patient  was given instructions and counseling regarding his condition or for health maintenance advice. Please see specific information pulled into the AVS if appropriate.     This document has been electronically signed by ALF Gonzales  on April 17, 2025 11:48 EDT

## 2025-04-17 ENCOUNTER — OFFICE VISIT (OUTPATIENT)
Dept: NEUROLOGY | Facility: CLINIC | Age: 60
End: 2025-04-17
Payer: MEDICARE

## 2025-04-17 VITALS
HEIGHT: 69 IN | WEIGHT: 185 LBS | SYSTOLIC BLOOD PRESSURE: 113 MMHG | DIASTOLIC BLOOD PRESSURE: 71 MMHG | HEART RATE: 54 BPM | BODY MASS INDEX: 27.4 KG/M2

## 2025-04-17 DIAGNOSIS — G30.9 ALZHEIMER DISEASE: Primary | ICD-10-CM

## 2025-04-17 DIAGNOSIS — F02.80 ALZHEIMER DISEASE: Primary | ICD-10-CM

## 2025-04-17 RX ORDER — VIBEGRON 75 MG/1
1 TABLET, FILM COATED ORAL DAILY
COMMUNITY
Start: 2025-03-18

## 2025-04-17 RX ORDER — MEMANTINE HYDROCHLORIDE 5 MG/1
5 TABLET ORAL 2 TIMES DAILY
Qty: 60 TABLET | Refills: 2 | Status: SHIPPED | OUTPATIENT
Start: 2025-04-17 | End: 2026-04-17

## 2025-04-17 RX ORDER — RISPERIDONE 0.5 MG/1
TABLET ORAL
COMMUNITY
Start: 2025-04-08

## 2025-04-17 RX ORDER — LORAZEPAM 1 MG/1
TABLET ORAL
COMMUNITY
Start: 2025-04-08

## 2025-04-27 ENCOUNTER — APPOINTMENT (OUTPATIENT)
Dept: CT IMAGING | Facility: HOSPITAL | Age: 60
End: 2025-04-27
Payer: MEDICARE

## 2025-04-27 ENCOUNTER — APPOINTMENT (OUTPATIENT)
Dept: GENERAL RADIOLOGY | Facility: HOSPITAL | Age: 60
End: 2025-04-27
Payer: MEDICARE

## 2025-04-27 ENCOUNTER — HOSPITAL ENCOUNTER (INPATIENT)
Facility: HOSPITAL | Age: 60
LOS: 2 days | Discharge: HOME | End: 2025-04-29
Attending: EMERGENCY MEDICINE | Admitting: STUDENT IN AN ORGANIZED HEALTH CARE EDUCATION/TRAINING PROGRAM
Payer: MEDICARE

## 2025-04-27 DIAGNOSIS — I47.10 SVT (SUPRAVENTRICULAR TACHYCARDIA): Primary | ICD-10-CM

## 2025-04-27 DIAGNOSIS — I49.9 CARDIAC ARRHYTHMIA, UNSPECIFIED: ICD-10-CM

## 2025-04-27 DIAGNOSIS — S80.02XA CONTUSION OF LEFT KNEE, INITIAL ENCOUNTER: ICD-10-CM

## 2025-04-27 DIAGNOSIS — I48.91 ATRIAL FIBRILLATION WITH RAPID VENTRICULAR RESPONSE: ICD-10-CM

## 2025-04-27 LAB
ALBUMIN SERPL-MCNC: 4.7 G/DL (ref 3.5–5.2)
ALBUMIN/GLOB SERPL: 1.6 G/DL
ALP SERPL-CCNC: 97 U/L (ref 39–117)
ALT SERPL W P-5'-P-CCNC: 13 U/L (ref 1–41)
AMPHET+METHAMPHET UR QL: NEGATIVE
AMPHETAMINES UR QL: NEGATIVE
ANION GAP SERPL CALCULATED.3IONS-SCNC: 13.1 MMOL/L (ref 5–15)
AST SERPL-CCNC: 15 U/L (ref 1–40)
BACTERIA UR QL AUTO: NORMAL /HPF
BARBITURATES UR QL SCN: NEGATIVE
BASOPHILS # BLD AUTO: 0.02 10*3/MM3 (ref 0–0.2)
BASOPHILS NFR BLD AUTO: 0.2 % (ref 0–1.5)
BENZODIAZ UR QL SCN: NEGATIVE
BILIRUB SERPL-MCNC: 1 MG/DL (ref 0–1.2)
BILIRUB UR QL STRIP: NEGATIVE
BUN SERPL-MCNC: 20 MG/DL (ref 6–20)
BUN/CREAT SERPL: 14.5 (ref 7–25)
BUPRENORPHINE SERPL-MCNC: NEGATIVE NG/ML
CALCIUM SPEC-SCNC: 9.8 MG/DL (ref 8.6–10.5)
CANNABINOIDS SERPL QL: POSITIVE
CHLORIDE SERPL-SCNC: 101 MMOL/L (ref 98–107)
CLARITY UR: CLEAR
CO2 SERPL-SCNC: 25.9 MMOL/L (ref 22–29)
COCAINE UR QL: NEGATIVE
COLOR UR: YELLOW
CREAT SERPL-MCNC: 1.38 MG/DL (ref 0.76–1.27)
DEPRECATED RDW RBC AUTO: 44 FL (ref 37–54)
EGFRCR SERPLBLD CKD-EPI 2021: 59.3 ML/MIN/1.73
EOSINOPHIL # BLD AUTO: 0.18 10*3/MM3 (ref 0–0.4)
EOSINOPHIL NFR BLD AUTO: 1.8 % (ref 0.3–6.2)
ERYTHROCYTE [DISTWIDTH] IN BLOOD BY AUTOMATED COUNT: 12.8 % (ref 12.3–15.4)
GLOBULIN UR ELPH-MCNC: 3 GM/DL
GLUCOSE SERPL-MCNC: 145 MG/DL (ref 65–99)
GLUCOSE UR STRIP-MCNC: NEGATIVE MG/DL
HBA1C MFR BLD: 6.9 % (ref 4.8–5.6)
HCT VFR BLD AUTO: 39 % (ref 37.5–51)
HGB BLD-MCNC: 13 G/DL (ref 13–17.7)
HGB UR QL STRIP.AUTO: NEGATIVE
HYALINE CASTS UR QL AUTO: NORMAL /LPF
IMM GRANULOCYTES # BLD AUTO: 0.03 10*3/MM3 (ref 0–0.05)
IMM GRANULOCYTES NFR BLD AUTO: 0.3 % (ref 0–0.5)
KETONES UR QL STRIP: NEGATIVE
LEUKOCYTE ESTERASE UR QL STRIP.AUTO: NEGATIVE
LYMPHOCYTES # BLD AUTO: 1.32 10*3/MM3 (ref 0.7–3.1)
LYMPHOCYTES NFR BLD AUTO: 13.1 % (ref 19.6–45.3)
MAGNESIUM SERPL-MCNC: 1.6 MG/DL (ref 1.6–2.6)
MCH RBC QN AUTO: 31.2 PG (ref 26.6–33)
MCHC RBC AUTO-ENTMCNC: 33.3 G/DL (ref 31.5–35.7)
MCV RBC AUTO: 93.5 FL (ref 79–97)
METHADONE UR QL SCN: NEGATIVE
MONOCYTES # BLD AUTO: 0.89 10*3/MM3 (ref 0.1–0.9)
MONOCYTES NFR BLD AUTO: 8.8 % (ref 5–12)
NEUTROPHILS NFR BLD AUTO: 7.66 10*3/MM3 (ref 1.7–7)
NEUTROPHILS NFR BLD AUTO: 75.8 % (ref 42.7–76)
NITRITE UR QL STRIP: NEGATIVE
NRBC BLD AUTO-RTO: 0 /100 WBC (ref 0–0.2)
OPIATES UR QL: POSITIVE
OXYCODONE UR QL SCN: NEGATIVE
PCP UR QL SCN: NEGATIVE
PH UR STRIP.AUTO: 7.5 [PH] (ref 5–8)
PLATELET # BLD AUTO: 167 10*3/MM3 (ref 140–450)
PMV BLD AUTO: 9.9 FL (ref 6–12)
POTASSIUM SERPL-SCNC: 3.9 MMOL/L (ref 3.5–5.2)
PROT SERPL-MCNC: 7.7 G/DL (ref 6–8.5)
PROT UR QL STRIP: ABNORMAL
RBC # BLD AUTO: 4.17 10*6/MM3 (ref 4.14–5.8)
RBC # UR STRIP: NORMAL /HPF
REF LAB TEST METHOD: NORMAL
SODIUM SERPL-SCNC: 140 MMOL/L (ref 136–145)
SP GR UR STRIP: 1.01 (ref 1–1.03)
SQUAMOUS #/AREA URNS HPF: NORMAL /HPF
T4 FREE SERPL-MCNC: 1.13 NG/DL (ref 0.93–1.7)
TRICYCLICS UR QL SCN: NEGATIVE
TSH SERPL DL<=0.05 MIU/L-ACNC: 2.22 UIU/ML (ref 0.27–4.2)
UROBILINOGEN UR QL STRIP: ABNORMAL
WBC # UR STRIP: NORMAL /HPF
WBC NRBC COR # BLD AUTO: 10.1 10*3/MM3 (ref 3.4–10.8)

## 2025-04-27 PROCEDURE — 25010000002 ADENOSINE PER 6 MG

## 2025-04-27 PROCEDURE — 80306 DRUG TEST PRSMV INSTRMNT: CPT | Performed by: STUDENT IN AN ORGANIZED HEALTH CARE EDUCATION/TRAINING PROGRAM

## 2025-04-27 PROCEDURE — 25010000002 MORPHINE PER 10 MG

## 2025-04-27 PROCEDURE — 73562 X-RAY EXAM OF KNEE 3: CPT

## 2025-04-27 PROCEDURE — 73700 CT LOWER EXTREMITY W/O DYE: CPT

## 2025-04-27 PROCEDURE — 80053 COMPREHEN METABOLIC PANEL: CPT | Performed by: EMERGENCY MEDICINE

## 2025-04-27 PROCEDURE — 25010000002 AMIODARONE IN DEXTROSE 5% 360-4.14 MG/200ML-% SOLUTION: Performed by: EMERGENCY MEDICINE

## 2025-04-27 PROCEDURE — 84439 ASSAY OF FREE THYROXINE: CPT | Performed by: EMERGENCY MEDICINE

## 2025-04-27 PROCEDURE — 81001 URINALYSIS AUTO W/SCOPE: CPT | Performed by: STUDENT IN AN ORGANIZED HEALTH CARE EDUCATION/TRAINING PROGRAM

## 2025-04-27 PROCEDURE — 99291 CRITICAL CARE FIRST HOUR: CPT

## 2025-04-27 PROCEDURE — 93005 ELECTROCARDIOGRAM TRACING: CPT | Performed by: EMERGENCY MEDICINE

## 2025-04-27 PROCEDURE — 70450 CT HEAD/BRAIN W/O DYE: CPT

## 2025-04-27 PROCEDURE — 85025 COMPLETE CBC W/AUTO DIFF WBC: CPT | Performed by: EMERGENCY MEDICINE

## 2025-04-27 PROCEDURE — 83735 ASSAY OF MAGNESIUM: CPT | Performed by: EMERGENCY MEDICINE

## 2025-04-27 PROCEDURE — 84443 ASSAY THYROID STIM HORMONE: CPT | Performed by: EMERGENCY MEDICINE

## 2025-04-27 PROCEDURE — 25810000003 SODIUM CHLORIDE 0.9 % SOLUTION: Performed by: STUDENT IN AN ORGANIZED HEALTH CARE EDUCATION/TRAINING PROGRAM

## 2025-04-27 PROCEDURE — 83036 HEMOGLOBIN GLYCOSYLATED A1C: CPT | Performed by: STUDENT IN AN ORGANIZED HEALTH CARE EDUCATION/TRAINING PROGRAM

## 2025-04-27 PROCEDURE — 25010000002 AMIODARONE IN DEXTROSE 5% 150-4.21 MG/100ML-% SOLUTION

## 2025-04-27 PROCEDURE — 25010000002 LORAZEPAM PER 2 MG

## 2025-04-27 RX ORDER — ADENOSINE 3 MG/ML
INJECTION, SOLUTION INTRAVENOUS
Status: COMPLETED
Start: 2025-04-27 | End: 2025-04-27

## 2025-04-27 RX ORDER — LORAZEPAM 2 MG/ML
0.5 INJECTION INTRAMUSCULAR ONCE
Status: COMPLETED | OUTPATIENT
Start: 2025-04-27 | End: 2025-04-27

## 2025-04-27 RX ORDER — SODIUM CHLORIDE 0.9 % (FLUSH) 0.9 %
10 SYRINGE (ML) INJECTION EVERY 12 HOURS SCHEDULED
Status: DISCONTINUED | OUTPATIENT
Start: 2025-04-27 | End: 2025-04-29 | Stop reason: HOSPADM

## 2025-04-27 RX ORDER — AMOXICILLIN 250 MG
2 CAPSULE ORAL 2 TIMES DAILY PRN
Status: DISCONTINUED | OUTPATIENT
Start: 2025-04-27 | End: 2025-04-29 | Stop reason: HOSPADM

## 2025-04-27 RX ORDER — MORPHINE SULFATE 2 MG/ML
INJECTION, SOLUTION INTRAMUSCULAR; INTRAVENOUS
Status: ACTIVE
Start: 2025-04-27 | End: 2025-04-28

## 2025-04-27 RX ORDER — LORAZEPAM 2 MG/ML
INJECTION INTRAMUSCULAR
Status: COMPLETED
Start: 2025-04-27 | End: 2025-04-27

## 2025-04-27 RX ORDER — POLYETHYLENE GLYCOL 3350 17 G/17G
17 POWDER, FOR SOLUTION ORAL DAILY PRN
Status: DISCONTINUED | OUTPATIENT
Start: 2025-04-27 | End: 2025-04-29 | Stop reason: HOSPADM

## 2025-04-27 RX ORDER — NITROGLYCERIN 0.4 MG/1
0.4 TABLET SUBLINGUAL
Status: DISCONTINUED | OUTPATIENT
Start: 2025-04-27 | End: 2025-04-29 | Stop reason: HOSPADM

## 2025-04-27 RX ORDER — HYDROMORPHONE HYDROCHLORIDE 1 MG/ML
0.5 INJECTION, SOLUTION INTRAMUSCULAR; INTRAVENOUS; SUBCUTANEOUS EVERY 4 HOURS PRN
Status: DISCONTINUED | OUTPATIENT
Start: 2025-04-27 | End: 2025-04-29 | Stop reason: HOSPADM

## 2025-04-27 RX ORDER — SODIUM CHLORIDE 0.9 % (FLUSH) 0.9 %
10 SYRINGE (ML) INJECTION AS NEEDED
Status: DISCONTINUED | OUTPATIENT
Start: 2025-04-27 | End: 2025-04-29 | Stop reason: HOSPADM

## 2025-04-27 RX ORDER — DONEPEZIL HYDROCHLORIDE 5 MG/1
5 TABLET, FILM COATED ORAL NIGHTLY
COMMUNITY

## 2025-04-27 RX ORDER — ADENOSINE 3 MG/ML
6 INJECTION, SOLUTION INTRAVENOUS ONCE
Status: DISCONTINUED | OUTPATIENT
Start: 2025-04-27 | End: 2025-04-27

## 2025-04-27 RX ORDER — SODIUM CHLORIDE 9 MG/ML
40 INJECTION, SOLUTION INTRAVENOUS AS NEEDED
Status: DISCONTINUED | OUTPATIENT
Start: 2025-04-27 | End: 2025-04-29 | Stop reason: HOSPADM

## 2025-04-27 RX ORDER — ADENOSINE 3 MG/ML
12 INJECTION, SOLUTION INTRAVENOUS ONCE
Status: COMPLETED | OUTPATIENT
Start: 2025-04-27 | End: 2025-04-27

## 2025-04-27 RX ORDER — DONEPEZIL HYDROCHLORIDE 5 MG/1
5 TABLET, FILM COATED ORAL NIGHTLY
Status: DISCONTINUED | OUTPATIENT
Start: 2025-04-27 | End: 2025-04-29 | Stop reason: HOSPADM

## 2025-04-27 RX ORDER — SODIUM CHLORIDE 9 MG/ML
75 INJECTION, SOLUTION INTRAVENOUS CONTINUOUS
Status: DISPENSED | OUTPATIENT
Start: 2025-04-27 | End: 2025-04-28

## 2025-04-27 RX ORDER — METOPROLOL TARTRATE 1 MG/ML
INJECTION, SOLUTION INTRAVENOUS
Status: COMPLETED
Start: 2025-04-27 | End: 2025-04-27

## 2025-04-27 RX ORDER — BISACODYL 10 MG
10 SUPPOSITORY, RECTAL RECTAL DAILY PRN
Status: DISCONTINUED | OUTPATIENT
Start: 2025-04-27 | End: 2025-04-29 | Stop reason: HOSPADM

## 2025-04-27 RX ORDER — BISACODYL 5 MG/1
5 TABLET, DELAYED RELEASE ORAL DAILY PRN
Status: DISCONTINUED | OUTPATIENT
Start: 2025-04-27 | End: 2025-04-29 | Stop reason: HOSPADM

## 2025-04-27 RX ORDER — METOPROLOL TARTRATE 1 MG/ML
5 INJECTION, SOLUTION INTRAVENOUS ONCE
Status: COMPLETED | OUTPATIENT
Start: 2025-04-27 | End: 2025-04-27

## 2025-04-27 RX ORDER — MEMANTINE HYDROCHLORIDE 10 MG/1
5 TABLET ORAL 2 TIMES DAILY
Status: DISCONTINUED | OUTPATIENT
Start: 2025-04-27 | End: 2025-04-29 | Stop reason: HOSPADM

## 2025-04-27 RX ADMIN — AMIODARONE HYDROCHLORIDE 1 MG/MIN: 1.8 INJECTION, SOLUTION INTRAVENOUS at 21:05

## 2025-04-27 RX ADMIN — Medication 10 ML: at 21:58

## 2025-04-27 RX ADMIN — ADENOSINE 12 MG: 3 INJECTION, SOLUTION INTRAVENOUS at 20:45

## 2025-04-27 RX ADMIN — LORAZEPAM 0.5 MG: 2 INJECTION INTRAMUSCULAR; INTRAVENOUS at 21:26

## 2025-04-27 RX ADMIN — Medication 4 MG: at 21:54

## 2025-04-27 RX ADMIN — LORAZEPAM 0.5 MG: 2 INJECTION INTRAMUSCULAR at 21:26

## 2025-04-27 RX ADMIN — ADENOSINE 12 MG: 3 INJECTION INTRAVENOUS at 20:45

## 2025-04-27 RX ADMIN — ADENOSINE 12 MG: 3 INJECTION INTRAVENOUS at 20:40

## 2025-04-27 RX ADMIN — AMIODARONE HYDROCHLORIDE 150 MG: 1.5 INJECTION, SOLUTION INTRAVENOUS at 20:52

## 2025-04-27 RX ADMIN — MEMANTINE HYDROCHLORIDE 5 MG: 10 TABLET, FILM COATED ORAL at 23:51

## 2025-04-27 RX ADMIN — SODIUM CHLORIDE 75 ML/HR: 9 INJECTION, SOLUTION INTRAVENOUS at 23:51

## 2025-04-27 RX ADMIN — METOPROLOL TARTRATE 5 MG: 1 INJECTION, SOLUTION INTRAVENOUS at 20:18

## 2025-04-27 RX ADMIN — ADENOSINE 12 MG: 3 INJECTION, SOLUTION INTRAVENOUS at 20:40

## 2025-04-27 RX ADMIN — MORPHINE SULFATE 4 MG: 4 INJECTION, SOLUTION INTRAMUSCULAR; INTRAVENOUS at 21:54

## 2025-04-27 RX ADMIN — ADENOSINE 12 MG: 3 INJECTION INTRAVENOUS at 20:13

## 2025-04-27 RX ADMIN — ADENOSINE 6 MG: 3 INJECTION INTRAVENOUS at 20:11

## 2025-04-27 RX ADMIN — DONEPEZIL HYDROCHLORIDE 5 MG: 5 TABLET, FILM COATED ORAL at 23:51

## 2025-04-27 RX ADMIN — METOROPROLOL TARTRATE 5 MG: 5 INJECTION, SOLUTION INTRAVENOUS at 20:18

## 2025-04-27 NOTE — Clinical Note
Level of Care: Progressive Care [20]   Admitting Physician: FREDDY SCHMIDT [[id number]]   Attending Physician: FREDDY SCHMIDT [[id number]]

## 2025-04-28 ENCOUNTER — PREP FOR SURGERY (OUTPATIENT)
Dept: OTHER | Facility: HOSPITAL | Age: 60
End: 2025-04-28
Payer: MEDICARE

## 2025-04-28 ENCOUNTER — APPOINTMENT (OUTPATIENT)
Dept: CARDIOLOGY | Facility: HOSPITAL | Age: 60
End: 2025-04-28
Payer: MEDICARE

## 2025-04-28 ENCOUNTER — ANESTHESIA (OUTPATIENT)
Dept: CARDIOLOGY | Facility: HOSPITAL | Age: 60
End: 2025-04-28
Payer: MEDICARE

## 2025-04-28 ENCOUNTER — ANESTHESIA EVENT (OUTPATIENT)
Dept: CARDIOLOGY | Facility: HOSPITAL | Age: 60
End: 2025-04-28
Payer: MEDICARE

## 2025-04-28 ENCOUNTER — HOSPITAL ENCOUNTER (INPATIENT)
Dept: CARDIOLOGY | Facility: HOSPITAL | Age: 60
End: 2025-04-28
Payer: MEDICARE

## 2025-04-28 DIAGNOSIS — I48.19 ATRIAL FIBRILLATION, PERSISTENT: ICD-10-CM

## 2025-04-28 DIAGNOSIS — I48.92 ATRIAL FLUTTER WITH RAPID VENTRICULAR RESPONSE: Primary | ICD-10-CM

## 2025-04-28 PROBLEM — I49.9 ARRHYTHMIA: Status: RESOLVED | Noted: 2025-04-27 | Resolved: 2025-04-28

## 2025-04-28 LAB
ALBUMIN SERPL-MCNC: 4.2 G/DL (ref 3.5–5.2)
ALBUMIN/GLOB SERPL: 1.4 G/DL
ALP SERPL-CCNC: 87 U/L (ref 39–117)
ALT SERPL W P-5'-P-CCNC: 12 U/L (ref 1–41)
ANION GAP SERPL CALCULATED.3IONS-SCNC: 11.9 MMOL/L (ref 5–15)
AORTIC DIMENSIONLESS INDEX: 0.83 (DI)
AST SERPL-CCNC: 15 U/L (ref 1–40)
AV MEAN PRESS GRAD SYS DOP V1V2: 4 MMHG
AV VMAX SYS DOP: 128 CM/SEC
BASOPHILS # BLD AUTO: 0.03 10*3/MM3 (ref 0–0.2)
BASOPHILS NFR BLD AUTO: 0.3 % (ref 0–1.5)
BH CV ECHO MEAS - ACS: 2.2 CM
BH CV ECHO MEAS - AO MAX PG: 6.6 MMHG
BH CV ECHO MEAS - AO V2 VTI: 18.9 CM
BH CV ECHO MEAS - AVA(I,D): 3.5 CM2
BH CV ECHO MEAS - EDV(MOD-SP4): 152 ML
BH CV ECHO MEAS - EF(MOD-SP4): 72.3 %
BH CV ECHO MEAS - ESV(MOD-SP4): 42.1 ML
BH CV ECHO MEAS - LV DIASTOLIC VOL/BSA (35-75): 77.1 CM2
BH CV ECHO MEAS - LV MAX PG: 3.7 MMHG
BH CV ECHO MEAS - LV MEAN PG: 2 MMHG
BH CV ECHO MEAS - LV SYSTOLIC VOL/BSA (12-30): 21.4 CM2
BH CV ECHO MEAS - LV V1 MAX: 95.9 CM/SEC
BH CV ECHO MEAS - LV V1 VTI: 15.7 CM
BH CV ECHO MEAS - LVOT AREA: 4.2 CM2
BH CV ECHO MEAS - LVOT DIAM: 2.3 CM
BH CV ECHO MEAS - MV DEC SLOPE: 369 CM/SEC2
BH CV ECHO MEAS - MV MAX PG: 3.1 MMHG
BH CV ECHO MEAS - MV MEAN PG: 2 MMHG
BH CV ECHO MEAS - MV P1/2T: 48.7 MSEC
BH CV ECHO MEAS - MV V2 VTI: 14.9 CM
BH CV ECHO MEAS - MVA(P1/2T): 4.5 CM2
BH CV ECHO MEAS - MVA(VTI): 4.4 CM2
BH CV ECHO MEAS - PA ACC TIME: 0.07 SEC
BH CV ECHO MEAS - PA V2 MAX: 110.5 CM/SEC
BH CV ECHO MEAS - RV MAX PG: 2.4 MMHG
BH CV ECHO MEAS - RV V1 MAX: 77.4 CM/SEC
BH CV ECHO MEAS - RV V1 VTI: 11.1 CM
BH CV ECHO MEAS - RVDD: 4.2 CM
BH CV ECHO MEAS - SV(LVOT): 65.2 ML
BH CV ECHO MEAS - SV(MOD-SP4): 109.9 ML
BH CV ECHO MEAS - SVI(LVOT): 33.1 ML/M2
BH CV ECHO MEAS - SVI(MOD-SP4): 55.8 ML/M2
BH CV ECHO MEAS - TR MAX PG: 32.9 MMHG
BH CV ECHO MEAS - TR MAX VEL: 287 CM/SEC
BILIRUB SERPL-MCNC: 0.8 MG/DL (ref 0–1.2)
BUN SERPL-MCNC: 17 MG/DL (ref 6–20)
BUN/CREAT SERPL: 13.2 (ref 7–25)
CALCIUM SPEC-SCNC: 9 MG/DL (ref 8.6–10.5)
CHLORIDE SERPL-SCNC: 104 MMOL/L (ref 98–107)
CO2 SERPL-SCNC: 24.1 MMOL/L (ref 22–29)
CREAT SERPL-MCNC: 1.29 MG/DL (ref 0.76–1.27)
DEPRECATED RDW RBC AUTO: 43.2 FL (ref 37–54)
EGFRCR SERPLBLD CKD-EPI 2021: 64.3 ML/MIN/1.73
EOSINOPHIL # BLD AUTO: 0.02 10*3/MM3 (ref 0–0.4)
EOSINOPHIL NFR BLD AUTO: 0.2 % (ref 0.3–6.2)
ERYTHROCYTE [DISTWIDTH] IN BLOOD BY AUTOMATED COUNT: 12.9 % (ref 12.3–15.4)
GLOBULIN UR ELPH-MCNC: 3 GM/DL
GLUCOSE SERPL-MCNC: 175 MG/DL (ref 65–99)
HCT VFR BLD AUTO: 35.3 % (ref 37.5–51)
HGB BLD-MCNC: 12.1 G/DL (ref 13–17.7)
IMM GRANULOCYTES # BLD AUTO: 0.02 10*3/MM3 (ref 0–0.05)
IMM GRANULOCYTES NFR BLD AUTO: 0.2 % (ref 0–0.5)
LYMPHOCYTES # BLD AUTO: 2.53 10*3/MM3 (ref 0.7–3.1)
LYMPHOCYTES NFR BLD AUTO: 24.6 % (ref 19.6–45.3)
MAGNESIUM SERPL-MCNC: 1.7 MG/DL (ref 1.6–2.6)
MCH RBC QN AUTO: 31.5 PG (ref 26.6–33)
MCHC RBC AUTO-ENTMCNC: 34.3 G/DL (ref 31.5–35.7)
MCV RBC AUTO: 91.9 FL (ref 79–97)
MONOCYTES # BLD AUTO: 1.28 10*3/MM3 (ref 0.1–0.9)
MONOCYTES NFR BLD AUTO: 12.4 % (ref 5–12)
NEUTROPHILS NFR BLD AUTO: 6.42 10*3/MM3 (ref 1.7–7)
NEUTROPHILS NFR BLD AUTO: 62.3 % (ref 42.7–76)
NRBC BLD AUTO-RTO: 0 /100 WBC (ref 0–0.2)
PHOSPHATE SERPL-MCNC: 2.5 MG/DL (ref 2.5–4.5)
PLATELET # BLD AUTO: 147 10*3/MM3 (ref 140–450)
PMV BLD AUTO: 10.1 FL (ref 6–12)
POTASSIUM SERPL-SCNC: 3.2 MMOL/L (ref 3.5–5.2)
POTASSIUM SERPL-SCNC: 3.6 MMOL/L (ref 3.5–5.2)
POTASSIUM SERPL-SCNC: 3.9 MMOL/L (ref 3.5–5.2)
PROT SERPL-MCNC: 7.2 G/DL (ref 6–8.5)
QT INTERVAL: 233 MS
QT INTERVAL: 243 MS
QT INTERVAL: 323 MS
QT INTERVAL: 336 MS
QT INTERVAL: 348 MS
QT INTERVAL: 360 MS
QT INTERVAL: 441 MS
QTC INTERVAL: 440 MS
QTC INTERVAL: 444 MS
QTC INTERVAL: 456 MS
QTC INTERVAL: 470 MS
QTC INTERVAL: 477 MS
QTC INTERVAL: 483 MS
QTC INTERVAL: 612 MS
RBC # BLD AUTO: 3.84 10*6/MM3 (ref 4.14–5.8)
SINUS: 3.7 CM
SODIUM SERPL-SCNC: 140 MMOL/L (ref 136–145)
STJ: 3.2 CM
WBC NRBC COR # BLD AUTO: 10.3 10*3/MM3 (ref 3.4–10.8)

## 2025-04-28 PROCEDURE — 25010000002 HYDROMORPHONE PER 4 MG: Performed by: STUDENT IN AN ORGANIZED HEALTH CARE EDUCATION/TRAINING PROGRAM

## 2025-04-28 PROCEDURE — 80053 COMPREHEN METABOLIC PANEL: CPT | Performed by: STUDENT IN AN ORGANIZED HEALTH CARE EDUCATION/TRAINING PROGRAM

## 2025-04-28 PROCEDURE — 93325 DOPPLER ECHO COLOR FLOW MAPG: CPT

## 2025-04-28 PROCEDURE — 25010000002 AMIODARONE IN DEXTROSE 5% 360-4.14 MG/200ML-% SOLUTION: Performed by: EMERGENCY MEDICINE

## 2025-04-28 PROCEDURE — 99222 1ST HOSP IP/OBS MODERATE 55: CPT | Performed by: INTERNAL MEDICINE

## 2025-04-28 PROCEDURE — 63710000001 PROMETHAZINE PER 12.5 MG: Performed by: STUDENT IN AN ORGANIZED HEALTH CARE EDUCATION/TRAINING PROGRAM

## 2025-04-28 PROCEDURE — 25810000003 SODIUM CHLORIDE 0.9 % SOLUTION: Performed by: NURSE ANESTHETIST, CERTIFIED REGISTERED

## 2025-04-28 PROCEDURE — 25010000002 PHENYLEPHRINE 10 MG/ML SOLUTION: Performed by: NURSE ANESTHETIST, CERTIFIED REGISTERED

## 2025-04-28 PROCEDURE — 5A2204Z RESTORATION OF CARDIAC RHYTHM, SINGLE: ICD-10-PCS | Performed by: INTERNAL MEDICINE

## 2025-04-28 PROCEDURE — 93005 ELECTROCARDIOGRAM TRACING: CPT | Performed by: INTERNAL MEDICINE

## 2025-04-28 PROCEDURE — 84132 ASSAY OF SERUM POTASSIUM: CPT | Performed by: INTERNAL MEDICINE

## 2025-04-28 PROCEDURE — 25010000002 AMIODARONE IN DEXTROSE 5% 360-4.14 MG/200ML-% SOLUTION: Performed by: INTERNAL MEDICINE

## 2025-04-28 PROCEDURE — 93321 DOPPLER ECHO F-UP/LMTD STD: CPT

## 2025-04-28 PROCEDURE — 93010 ELECTROCARDIOGRAM REPORT: CPT | Performed by: INTERNAL MEDICINE

## 2025-04-28 PROCEDURE — 84132 ASSAY OF SERUM POTASSIUM: CPT | Performed by: STUDENT IN AN ORGANIZED HEALTH CARE EDUCATION/TRAINING PROGRAM

## 2025-04-28 PROCEDURE — 25010000002 POTASSIUM CHLORIDE 10 MEQ/100ML SOLUTION: Performed by: INTERNAL MEDICINE

## 2025-04-28 PROCEDURE — 84100 ASSAY OF PHOSPHORUS: CPT | Performed by: STUDENT IN AN ORGANIZED HEALTH CARE EDUCATION/TRAINING PROGRAM

## 2025-04-28 PROCEDURE — 99222 1ST HOSP IP/OBS MODERATE 55: CPT

## 2025-04-28 PROCEDURE — 85025 COMPLETE CBC W/AUTO DIFF WBC: CPT | Performed by: STUDENT IN AN ORGANIZED HEALTH CARE EDUCATION/TRAINING PROGRAM

## 2025-04-28 PROCEDURE — 92960 CARDIOVERSION ELECTRIC EXT: CPT | Performed by: INTERNAL MEDICINE

## 2025-04-28 PROCEDURE — 93306 TTE W/DOPPLER COMPLETE: CPT

## 2025-04-28 PROCEDURE — 93306 TTE W/DOPPLER COMPLETE: CPT | Performed by: INTERNAL MEDICINE

## 2025-04-28 PROCEDURE — 25010000002 PROPOFOL 200 MG/20ML EMULSION: Performed by: NURSE ANESTHETIST, CERTIFIED REGISTERED

## 2025-04-28 PROCEDURE — 93005 ELECTROCARDIOGRAM TRACING: CPT | Performed by: STUDENT IN AN ORGANIZED HEALTH CARE EDUCATION/TRAINING PROGRAM

## 2025-04-28 PROCEDURE — 25010000002 DIGOXIN PER 500 MCG

## 2025-04-28 PROCEDURE — 83735 ASSAY OF MAGNESIUM: CPT | Performed by: STUDENT IN AN ORGANIZED HEALTH CARE EDUCATION/TRAINING PROGRAM

## 2025-04-28 RX ORDER — METOPROLOL SUCCINATE 50 MG/1
100 TABLET, EXTENDED RELEASE ORAL
Status: DISCONTINUED | OUTPATIENT
Start: 2025-04-28 | End: 2025-04-29 | Stop reason: HOSPADM

## 2025-04-28 RX ORDER — POTASSIUM CHLORIDE 7.45 MG/ML
10 INJECTION INTRAVENOUS
Status: COMPLETED | OUTPATIENT
Start: 2025-04-28 | End: 2025-04-28

## 2025-04-28 RX ORDER — ATORVASTATIN CALCIUM 40 MG/1
40 TABLET, FILM COATED ORAL NIGHTLY
Status: DISCONTINUED | OUTPATIENT
Start: 2025-04-28 | End: 2025-04-29 | Stop reason: HOSPADM

## 2025-04-28 RX ORDER — POTASSIUM CHLORIDE 1500 MG/1
40 TABLET, EXTENDED RELEASE ORAL EVERY 4 HOURS
Status: COMPLETED | OUTPATIENT
Start: 2025-04-28 | End: 2025-04-28

## 2025-04-28 RX ORDER — CLOPIDOGREL BISULFATE 75 MG/1
75 TABLET ORAL DAILY
Status: DISCONTINUED | OUTPATIENT
Start: 2025-04-28 | End: 2025-04-29 | Stop reason: HOSPADM

## 2025-04-28 RX ORDER — METOPROLOL TARTRATE 1 MG/ML
5 INJECTION, SOLUTION INTRAVENOUS EVERY 4 HOURS PRN
Status: DISCONTINUED | OUTPATIENT
Start: 2025-04-28 | End: 2025-04-29 | Stop reason: HOSPADM

## 2025-04-28 RX ORDER — LORAZEPAM 1 MG/1
1 TABLET ORAL EVERY 6 HOURS PRN
Status: DISCONTINUED | OUTPATIENT
Start: 2025-04-28 | End: 2025-04-29 | Stop reason: HOSPADM

## 2025-04-28 RX ORDER — SODIUM CHLORIDE 9 MG/ML
INJECTION, SOLUTION INTRAVENOUS CONTINUOUS PRN
Status: DISCONTINUED | OUTPATIENT
Start: 2025-04-28 | End: 2025-04-28 | Stop reason: SURG

## 2025-04-28 RX ORDER — BUSPIRONE HYDROCHLORIDE 5 MG/1
5 TABLET ORAL EVERY 12 HOURS SCHEDULED
Status: DISCONTINUED | OUTPATIENT
Start: 2025-04-28 | End: 2025-04-29 | Stop reason: HOSPADM

## 2025-04-28 RX ORDER — METOPROLOL TARTRATE 1 MG/ML
INJECTION, SOLUTION INTRAVENOUS
Status: ACTIVE
Start: 2025-04-28 | End: 2025-04-28

## 2025-04-28 RX ORDER — AMIODARONE HYDROCHLORIDE 200 MG/1
200 TABLET ORAL EVERY 12 HOURS SCHEDULED
Status: DISCONTINUED | OUTPATIENT
Start: 2025-04-28 | End: 2025-04-29 | Stop reason: HOSPADM

## 2025-04-28 RX ORDER — PROMETHAZINE HYDROCHLORIDE 12.5 MG/1
12.5 TABLET ORAL ONCE
Status: COMPLETED | OUTPATIENT
Start: 2025-04-28 | End: 2025-04-28

## 2025-04-28 RX ORDER — PHENYLEPHRINE HYDROCHLORIDE 10 MG/ML
INJECTION INTRAVENOUS AS NEEDED
Status: DISCONTINUED | OUTPATIENT
Start: 2025-04-28 | End: 2025-04-28 | Stop reason: SURG

## 2025-04-28 RX ORDER — PROPOFOL 10 MG/ML
INJECTION, EMULSION INTRAVENOUS AS NEEDED
Status: DISCONTINUED | OUTPATIENT
Start: 2025-04-28 | End: 2025-04-28 | Stop reason: SURG

## 2025-04-28 RX ORDER — DIGOXIN 0.25 MG/ML
500 INJECTION INTRAMUSCULAR; INTRAVENOUS ONCE
Status: COMPLETED | OUTPATIENT
Start: 2025-04-28 | End: 2025-04-28

## 2025-04-28 RX ORDER — DIGOXIN 0.25 MG/ML
INJECTION INTRAMUSCULAR; INTRAVENOUS
Status: COMPLETED
Start: 2025-04-28 | End: 2025-04-28

## 2025-04-28 RX ADMIN — LORAZEPAM 1 MG: 1 TABLET ORAL at 18:21

## 2025-04-28 RX ADMIN — METOROPROLOL TARTRATE 5 MG: 5 INJECTION, SOLUTION INTRAVENOUS at 02:39

## 2025-04-28 RX ADMIN — APIXABAN 5 MG: 5 TABLET, FILM COATED ORAL at 10:53

## 2025-04-28 RX ADMIN — BUSPIRONE HYDROCHLORIDE 5 MG: 5 TABLET ORAL at 20:52

## 2025-04-28 RX ADMIN — LORAZEPAM 1 MG: 1 TABLET ORAL at 12:29

## 2025-04-28 RX ADMIN — Medication 5 MG: at 20:52

## 2025-04-28 RX ADMIN — Medication 10 ML: at 09:39

## 2025-04-28 RX ADMIN — POTASSIUM CHLORIDE 10 MEQ: 7.46 INJECTION, SOLUTION INTRAVENOUS at 06:01

## 2025-04-28 RX ADMIN — AMIODARONE HYDROCHLORIDE 200 MG: 200 TABLET ORAL at 20:52

## 2025-04-28 RX ADMIN — MUPIROCIN 1 APPLICATION: 20 OINTMENT TOPICAL at 08:59

## 2025-04-28 RX ADMIN — METOROPROLOL TARTRATE 5 MG: 5 INJECTION, SOLUTION INTRAVENOUS at 11:40

## 2025-04-28 RX ADMIN — PROPOFOL 25 MG: 10 INJECTION, EMULSION INTRAVENOUS at 16:45

## 2025-04-28 RX ADMIN — POTASSIUM CHLORIDE 10 MEQ: 7.46 INJECTION, SOLUTION INTRAVENOUS at 07:03

## 2025-04-28 RX ADMIN — POTASSIUM CHLORIDE 40 MEQ: 1500 TABLET, EXTENDED RELEASE ORAL at 15:18

## 2025-04-28 RX ADMIN — MUPIROCIN 1 APPLICATION: 20 OINTMENT TOPICAL at 20:52

## 2025-04-28 RX ADMIN — DIGOXIN 500 MCG: 0.25 INJECTION INTRAMUSCULAR; INTRAVENOUS at 03:13

## 2025-04-28 RX ADMIN — Medication 10 ML: at 20:52

## 2025-04-28 RX ADMIN — AMIODARONE HYDROCHLORIDE 1 MG/MIN: 1.8 INJECTION, SOLUTION INTRAVENOUS at 09:38

## 2025-04-28 RX ADMIN — CLOPIDOGREL BISULFATE 75 MG: 75 TABLET, FILM COATED ORAL at 10:53

## 2025-04-28 RX ADMIN — PROMETHAZINE HYDROCHLORIDE 12.5 MG: 12.5 TABLET ORAL at 12:29

## 2025-04-28 RX ADMIN — HYDROMORPHONE HYDROCHLORIDE 0.5 MG: 1 INJECTION, SOLUTION INTRAMUSCULAR; INTRAVENOUS; SUBCUTANEOUS at 08:59

## 2025-04-28 RX ADMIN — ATORVASTATIN CALCIUM 40 MG: 40 TABLET, FILM COATED ORAL at 20:52

## 2025-04-28 RX ADMIN — AMIODARONE HYDROCHLORIDE 1 MG/MIN: 1.8 INJECTION, SOLUTION INTRAVENOUS at 14:09

## 2025-04-28 RX ADMIN — PHENYLEPHRINE HYDROCHLORIDE 100 MCG: 10 INJECTION INTRAVENOUS at 16:51

## 2025-04-28 RX ADMIN — HYDROMORPHONE HYDROCHLORIDE 0.5 MG: 1 INJECTION, SOLUTION INTRAMUSCULAR; INTRAVENOUS; SUBCUTANEOUS at 03:42

## 2025-04-28 RX ADMIN — PROPOFOL 100 MG: 10 INJECTION, EMULSION INTRAVENOUS at 16:42

## 2025-04-28 RX ADMIN — AMIODARONE HYDROCHLORIDE 1 MG/MIN: 1.8 INJECTION, SOLUTION INTRAVENOUS at 02:48

## 2025-04-28 RX ADMIN — METOPROLOL SUCCINATE 100 MG: 50 TABLET, EXTENDED RELEASE ORAL at 10:53

## 2025-04-28 RX ADMIN — AMIODARONE HYDROCHLORIDE 1 MG/MIN: 1.8 INJECTION, SOLUTION INTRAVENOUS at 05:34

## 2025-04-28 RX ADMIN — POTASSIUM CHLORIDE 40 MEQ: 1500 TABLET, EXTENDED RELEASE ORAL at 20:52

## 2025-04-28 RX ADMIN — SODIUM CHLORIDE: 9 INJECTION, SOLUTION INTRAVENOUS at 16:37

## 2025-04-28 RX ADMIN — MEMANTINE HYDROCHLORIDE 5 MG: 10 TABLET, FILM COATED ORAL at 20:49

## 2025-04-28 RX ADMIN — POTASSIUM CHLORIDE 10 MEQ: 7.46 INJECTION, SOLUTION INTRAVENOUS at 04:38

## 2025-04-28 RX ADMIN — DONEPEZIL HYDROCHLORIDE 5 MG: 5 TABLET, FILM COATED ORAL at 20:52

## 2025-04-28 RX ADMIN — MEMANTINE HYDROCHLORIDE 5 MG: 10 TABLET, FILM COATED ORAL at 08:41

## 2025-04-28 RX ADMIN — POTASSIUM CHLORIDE 10 MEQ: 7.46 INJECTION, SOLUTION INTRAVENOUS at 03:26

## 2025-04-28 RX ADMIN — APIXABAN 5 MG: 5 TABLET, FILM COATED ORAL at 20:52

## 2025-04-28 RX ADMIN — PHENYLEPHRINE HYDROCHLORIDE 100 MCG: 10 INJECTION INTRAVENOUS at 16:46

## 2025-04-28 NOTE — CONSULTS
Referring Provider:     Paul Elliott MD     Reason for Consultation: Anxiety secondary to dementia    Chief complaint : Insomnia    Subjective .     History of present illness:  The patient is a 60 y.o. male who was admitted secondary to lightheadedness.  PMH:  MI age 28 with stents, paroxysmal A-fib, early onset Alzheimer's, DM2 and benign essential hypertension   Psychiatry consulted secondary to anxiety associated with early onset Alzheimer's.    Patient was a very poor historian-able to state his name but not events leading to admission.  Patient was intermittently tearful-frustrated with declining cognitive sxs and diagnosis of alzheimers-reported being unable to sleep -increased tension and anxiety unable to relax  History obtained from spouse at bedside with patient permission.  Psychiatric history mostly unremarkable no previous admissions or medications-no previous episodes of depression. Patient's spouse reported patient was diagnosed recently with Alzheimer's-has had progressive cognitive changes over the last 6 months-minor changes leading up to that for approx one year.  6 months ago patient was unable to remember his name or date of birth prompting spouse to initiate treatment with neurology for cognitive evaluation.  Since that time patient has had intermittent agitation and irritability-mood and behavioral disturbances.  Spouse expressed concern with progression of Alzheimer's-wanting patient to remain at home as long as possible.  Over the last several months patient has become increasingly depressed and withdrawn with decreased energy, and progressive memory decline- Adderall was started 2 weeks ago to improve energy and cognitive functioning.  Outpatient medications duloxetine - spouse reported no benefit for mood or anxiety.           The following portions of the patient's history were reviewed and updated as appropriate: allergies, current medications, past family history, past medical  "history, past social history, past surgical history and problem list.    History    Objective     Vital Signs   BP (!) 143/101   Pulse (!) 162   Temp 98.2 °F (36.8 °C) (Oral)   Resp 22   Ht 175.3 cm (69\")   Wt 81.2 kg (179 lb)   SpO2 97%   BMI 26.43 kg/m²       Mental Status Exam:   Hygiene:   fair  Cooperation:   Attempting to be cooperative  Eye Contact:  Fair  Psychomotor Behavior:  Slow  Affect:   Intermittently tearful  Mood: sad and anxious  Speech:  Minimal  Thought Process:  Unable to demonstrate  Thought Content:  Unable to demonstrate  Suicidal:   Did not express  Homicidal:   Did not express  Hallucinations:  Not demonstrated today  Delusion:  Other did not contribute delusional content for discussion  Memory:  Deficits  Orientation:  Person  Reliability:  poor  Insight:  Poor  Judgement:  Impaired  Impulse Control:  Fair            Assessment & Plan       Atrial flutter with rapid ventricular response       Assessment: Depression and anxiety associated with dementia diagnosis, insomnia     Treatment Plan: Patient presents with cognitive deficits associated with Alzheimer's  Sleep disturbances/insomnia , intermittent irritability, tension associated with anxiety.  The patient is taking duloxetine 60 mg outpt for depression and anxiety -was started on Adderall approximately 2 weeks ago for cognitive sxs and hypersomnia, depression.  Pts spouse expressed concern for progressive intermittent agitation and irritability at home discussed risk and benefits with Rexulti-which is indicated for agitation with dementia, she was agreeable to discuss   Continue donepezil 5 mg nightly, Namenda 5 mg twice daily  Based on cardiac sxs-Adderall should be d/c  -no taper needed with stimulant therapy  During admission patient had an along Qtc most recent qtc 440.  Pt would likely benefit from Rexulti for agitation, and depression associated with dementia   when cardiac sxs are stabilized      Patient has lorazepam as " needed for anxiety with dementia BZD can worsen confusion and increase risk for delirium.   Start Melatonin 5 mg nightly for insomnia   Start Buspirone 5 mg BID for anxiety -also benefit for depression   Will follow   Treatment Plan discussed with: Patient and Family    I discussed the patients findings and my recommendations with patient, family, and nursing staff    I have reviewed and approved the behavioral health treatment plans and problem list. Yes  Thank you for the consult   Referring MD has access to consult report and progress notes in EMR     Adelaide Cullen DNP, APRN  04/28/25  15:21 EDT

## 2025-04-28 NOTE — CONSULTS
HP      Name: Kamar Scott ADMIT: 2025   : 1965  PCP: Buddy Lafleur MD    MRN: [id number] LOS: 1 days   AGE/SEX: 60 y.o. male  ROOM: [facility]/     Chief Complaint   Patient presents with    Knee Pain       Subjective        History of present illness  Kamar Scott is a 60-year-old male patient who normally follows at [facility], is admitted to the hospital due to swelling and pain in his left knee, in the ER he was found to be in atrial flutter with rapid ventricular rates, he was placed on amiodarone drip.  Looking at notes from [place], he has history of CAD, prior PCI, hypertension, dyslipidemia, paroxysmal atrial flutter, previous cardioversion.  Patient was placed on Eliquis about a week ago.    Past Medical History:   Diagnosis Date    Hypertension     Peripheral neuropathy      Past Surgical History:   Procedure Laterality Date    CARDIAC SURGERY      HERNIA REPAIR      KNEE ARTHROSCOPY      ORTHOPEDIC SURGERY      SHOULDER ROTATOR CUFF REPAIR      SPINAL FUSION       Family History   Problem Relation Age of Onset    Neuropathy Father     Dementia Father      Social History     Tobacco Use    Smoking status: Every Day     Types: Cigarettes   Vaping Use    Vaping status: Never Used   Substance Use Topics    Alcohol use: Not Currently    Drug use: Yes     Types: Marijuana     Medications Prior to Admission   Medication Sig Dispense Refill Last Dose/Taking    apixaban (ELIQUIS) 5 MG tablet tablet Take 1 tablet by mouth Every 12 (Twelve) Hours.   2025 Morning    aspirin 81 MG EC tablet Take 1 tablet by mouth Daily.   Taking    atorvastatin (LIPITOR) 40 MG tablet Take 1 tablet by mouth Daily.   Taking    clopidogrel (PLAVIX) 75 MG tablet Take 1 tablet by mouth Daily.   Taking    dilTIAZem CD (CARDIZEM CD) 300 MG 24 hr capsule Take 1 capsule by mouth every night at bedtime.   Taking    donepezil (ARICEPT) 5 MG tablet Take 1 tablet by mouth Every Night.   Taking    DULoxetine  (CYMBALTA) 60 MG capsule Take 1 capsule by mouth Daily.   Taking    gabapentin (NEURONTIN) 300 MG capsule Take 1 capsule by mouth 2 (Two) Times a Day.   Taking    Gemtesa 75 MG tablet Take 1 tablet by mouth Daily.   Taking    HYDROcodone-acetaminophen (NORCO)  MG per tablet TAKE 1 TABLET BY MOUTH EVERY 6 HOURS AS NEEDED FOR 30 DAYS   Taking    lisinopril (PRINIVIL,ZESTRIL) 5 MG tablet Take 1 tablet by mouth Daily.   Taking    LORazepam (ATIVAN) 1 MG tablet TAKE 1 TABLET EVERY DAY BY MOUTH AS NEEDED FOR 15 DAYS, FOR ANXIETY.   Taking    memantine (NAMENDA) 5 MG tablet Take 1 tablet by mouth 2 (Two) Times a Day. 60 tablet 2 Taking    metoprolol succinate XL (TOPROL-XL) 100 MG 24 hr tablet Take 1 tablet by mouth 2 (Two) Times a Day.   Taking    multivitamin with minerals tablet tablet Take 1 tablet by mouth Daily.   Taking    risperiDONE (risperDAL) 0.5 MG tablet take 1 tablet by mouth twice a day as directed   Taking    tamsulosin (FLOMAX) 0.4 MG capsule 24 hr capsule Take 1 capsule by mouth 2 (Two) Times a Day.   Taking     Allergies:  Penicillin g    Review of systems    Constitutional: Negative.    Respiratory and cardiovascular: As detailed in HPI section.  Gastrointestinal: Negative for constipation, nausea and vomiting negative for abdominal distention, abdominal pain and diarrhea.   Genitourinary: Negative for difficulty urinating and flank pain.   Musculoskeletal: Negative for arthralgias, joint swelling and myalgias.   Skin: Negative for color change, rash and wound.   Neurological: Negative for dizziness, syncope, weakness and headaches.   Hematological: Negative for adenopathy.   Psychiatric/Behavioral: Negative for confusion.   All other systems reviewed and are negative.       Physical Exam  VITALS REVIEWED    General:      well developed, in no acute distress.    Head:      normocephalic and atraumatic.    Eyes:      PERRL/EOM intact, conjunctiva and sclera clear with out nystagmus.    Neck:       no masses, thyromegaly,  trachea central with normal respiratory effort and PMI displaced laterally  Lungs:      Clear to auscultation bilaterally  Heart:       Regular rate and rhythm, tachycardic  Msk:      no deformity or scoliosis noted of thoracic or lumbar spine.    Pulses:      pulses normal in all 4 extremities.    Extremities:       No lower extremity edema, left knee swollen  Neurologic:      no focal deficits.   alert oriented x3  Skin:      intact without lesions or rashes.    Psych:      alert and cooperative; normal mood and affect; normal attention span and concentration.      Result Review :               Pertinent cardiac workup    EKG 4/28/2025 atrial flutter ventricular rate 117 bpm.  EKG 4/27/2025 atrial flutter one-to-one conduction, ventricular rate 110 bpm.  Echo 4/28/2025 ejection fraction 55 to 60%        Assessment and Plan         Atrial flutter with rapid ventricular response      Kamar Scott is a 60-year-old male patient who has history of coronary artery disease, atrial flutter, previous cardioversion according to patient.  Patient follows at [place] [facility].  He presented to the hospital due to swelling and pain in his left knee and he was found to be in atrial flutter heart rates going up to 200 bpm during one-to-one conduction episodes.  He is started on IV amiodarone but remains in atrial flutter.  He is symptomatic with shortness of breath.  He said that he was started on Eliquis about a week ago.  I will go ahead and perform KENIA guided cardioversion, and keep him on amiodarone.  After that we can either schedule him for ablation or through his primary cardiologist.        No follow-ups on file.  Patient was given instructions and counseling regarding his condition or for health maintenance advice. Please see specific information pulled into the AVS if appropriate.        Electronically signed by Sarah Ortega MD, 04/28/25, 2:36 PM EDT.

## 2025-04-28 NOTE — ED PROVIDER NOTES
Subjective   History of Present Illness  60-year-old male presents for left knee pain after fall that occurred last night.  Has been having episodes of lightheadedness.  Reportedly did not hit head and denies headache.  History of CAD and paroxysmal SVT.  No chest pain or shortness of breath.  Patient is feels lightheaded at times.  Nothing specifically seems to bring it on.  Pain in left knee is localized to anterior knee.  Had recent diagnosis of moderate early onset Alzheimer's dementia with neurology.  Patient on Delphos for chronic back pain.  On apixaban and aspirin.  Takes diltiazem and metoprolol for rate control.      Review of recent note from [facility] shows that patient was admitted with NSTEMI found to have diffuse T wave inversions and ST depressions.  Was complaining of anterior neck pain at that time.  Patient signed out AGAINST MEDICAL ADVICE before having scheduled stress test.  Review of Systems  See HPI.  Past Medical History:   Diagnosis Date    Hypertension     Peripheral neuropathy        Allergies   Allergen Reactions    Penicillin G Rash       Past Surgical History:   Procedure Laterality Date    CARDIAC SURGERY  1975    HERNIA REPAIR      KNEE ARTHROSCOPY      ORTHOPEDIC SURGERY      SHOULDER ROTATOR CUFF REPAIR      SPINAL FUSION         Family History   Problem Relation Age of Onset    Neuropathy Father     Dementia Father        Social History     Socioeconomic History    Marital status:    Tobacco Use    Smoking status: Every Day     Types: Cigarettes   Vaping Use    Vaping status: Never Used   Substance and Sexual Activity    Alcohol use: Not Currently    Drug use: Yes     Types: Marijuana    Sexual activity: Defer           Objective   Physical Exam  No acute distress, tachycardic rate and regular rhythm, intact distal pulses,.  Clear to auscultation bilaterally, large effusion to left knee with diffuse tenderness to palpation but no overlying erythema, NCAT,  "abdomen soft and nontender, no tenderness to palpation over bilateral hips or left ankle.  Pain with any flexion extension in left knee.  Minimal laxity with valgus and varus stress, negative anterior and posterior drawer test, negative Lachman's maneuver, sensation intact to light touch distally left lower extremity, no Barry sign, no raccoon eyes, no midline palpation, flat affect  Procedures           ED Course  ED Course as of 04/28/25 0137   Sun Apr 27, 2025   2131 ECG 12 Lead Rhythm Change [CH]      ED Course User Index  [CH] Herbie Dick MD    /99   Pulse 115   Temp 98.9 °F (37.2 °C) (Oral)   Resp 22   Ht 175.3 cm (69\")   Wt 81.8 kg (180 lb 5.4 oz)   SpO2 96%   BMI 26.63 kg/m²   Labs Reviewed   COMPREHENSIVE METABOLIC PANEL - Abnormal; Notable for the following components:       Result Value    Glucose 145 (*)     Creatinine 1.38 (*)     eGFR 59.3 (*)     All other components within normal limits    Narrative:     GFR Categories in Chronic Kidney Disease (CKD)      GFR Category          GFR (mL/min/1.73)    Interpretation  G1                     90 or greater         Normal or high (1)  G2                      60-89                Mild decrease (1)  G3a                   45-59                Mild to moderate decrease  G3b                   30-44                Moderate to severe decrease  G4                    15-29                Severe decrease  G5                    14 or less           Kidney failure          (1)In the absence of evidence of kidney disease, neither GFR category G1 or G2 fulfill the criteria for CKD.    eGFR calculation 2021 CKD-EPI creatinine equation, which does not include race as a factor   CBC WITH AUTO DIFFERENTIAL - Abnormal; Notable for the following components:    Lymphocyte % 13.1 (*)     Neutrophils, Absolute 7.66 (*)     All other components within normal limits   URINE DRUG SCREEN - Abnormal; Notable for the following components:    THC, Screen, Urine " Positive (*)     Opiate Screen Positive (*)     All other components within normal limits    Narrative:     Cutoff For Drugs Screened:    Amphetamines               500 ng/ml  Barbiturates               200 ng/ml  Benzodiazepines            150 ng/ml  Cocaine                    150 ng/ml  Methadone                  200 ng/ml  Opiates                    100 ng/ml  Phencyclidine               25 ng/ml  THC                         50 ng/ml  Methamphetamine            500 ng/ml  Tricyclic Antidepressants  300 ng/ml  Oxycodone                  100 ng/ml  Buprenorphine               10 ng/ml    The normal value for all drugs tested is negative. This report includes unconfirmed screening results, with the cutoff values listed, to be used for medical treatment purposes only.  Unconfirmed results must not be used for non-medical purposes such as employment or legal testing.  Clinical consideration should be applied to any drug of abuse test, particularly when unconfirmed results are used.    All urine drugs of abuse requests without chain of custody are for medical screening purposes only.  False positives are possible.     URINALYSIS W/ MICROSCOPIC IF INDICATED (NO CULTURE) - Abnormal; Notable for the following components:    Protein, UA 30 mg/dL (1+) (*)     All other components within normal limits   HEMOGLOBIN A1C - Abnormal; Notable for the following components:    Hemoglobin A1C 6.90 (*)     All other components within normal limits   TSH - Normal   T4, FREE - Normal   MAGNESIUM - Normal   URINALYSIS, MICROSCOPIC ONLY   COMPREHENSIVE METABOLIC PANEL   MAGNESIUM   PHOSPHORUS   CBC WITH AUTO DIFFERENTIAL   CBC AND DIFFERENTIAL    Narrative:     The following orders were created for panel order CBC & Differential.  Procedure                               Abnormality         Status                     ---------                               -----------         ------                     CBC Auto Differential[[id number]]         Abnormal            Final result                 Please view results for these tests on the individual orders.   CBC AND DIFFERENTIAL    Narrative:     The following orders were created for panel order CBC & Differential.  Procedure                               Abnormality         Status                     ---------                               -----------         ------                     CBC Auto Differential[[id number]]                                                         Please view results for these tests on the individual orders.     Medications   sodium chloride 0.9 % flush 10 mL (has no administration in time range)   amiodarone 150 mg in 100 mL D5W (loading dose) (0 mg Intravenous Stopped 4/27/25 2204)     Followed by   amiodarone 360 mg in 200 mL D5W infusion (1 mg/min Intravenous New Bag 4/27/25 2105)     Followed by   amiodarone 360 mg in 200 mL D5W infusion (has no administration in time range)   sodium chloride 0.9 % flush 10 mL (10 mL Intravenous Given 4/27/25 2158)   sodium chloride 0.9 % flush 10 mL (has no administration in time range)   sodium chloride 0.9 % infusion 40 mL (has no administration in time range)   nitroglycerin (NITROSTAT) SL tablet 0.4 mg (has no administration in time range)   Potassium Replacement - Follow Nurse / BPA Driven Protocol (has no administration in time range)   Magnesium Standard Dose Replacement - Follow Nurse / BPA Driven Protocol (has no administration in time range)   Phosphorus Replacement - Follow Nurse / BPA Driven Protocol (has no administration in time range)   Calcium Replacement - Follow Nurse / BPA Driven Protocol (has no administration in time range)   sennosides-docusate (PERICOLACE) 8.6-50 MG per tablet 2 tablet (has no administration in time range)     And   polyethylene glycol (MIRALAX) packet 17 g (has no administration in time range)     And   bisacodyl (DULCOLAX) EC tablet 5 mg (has no administration in time range)     And   bisacodyl  (DULCOLAX) suppository 10 mg (has no administration in time range)   morphine injection (  Not Given 4/27/25 2155)   memantine (NAMENDA) tablet 5 mg (5 mg Oral Given 4/27/25 2351)   donepezil (ARICEPT) tablet 5 mg (5 mg Oral Given 4/27/25 2351)   sodium chloride 0.9 % infusion (75 mL/hr Intravenous New Bag 4/27/25 2351)   HYDROmorphone (DILAUDID) injection 0.5 mg (has no administration in time range)   adenosine (ADENOCARD) injection (6 mg Intravenous Given 4/27/25 2011)   adenosine (ADENOCARD) injection (12 mg Intravenous Given 4/27/25 2013)   adenosine (ADENOCARD) injection 12 mg (12 mg Intravenous Given 4/27/25 2040)   adenosine (ADENOCARD) injection 12 mg (12 mg Intravenous Given 4/27/25 2045)   LORazepam (ATIVAN) injection 0.5 mg (0.5 mg Intravenous Given 4/27/25 2126)   metoprolol tartrate (LOPRESSOR) injection 5 mg (5 mg Intravenous Given 4/27/25 2018)   morphine injection 4 mg (4 mg Intravenous Given 4/27/25 2154)     CT Head Without Contrast   Final Result   Impression:   No acute intracranial abnormality.               Electronically Signed: Reji Sesay MD     4/28/2025 1:29 AM EDT     Workstation ID: [id number]      CT Lower Extremity Left Without Contrast   Final Result   Impression:   1.No acute osseous abnormality.   2.Severe tricompartmental osteoarthritis with large knee joint effusion and a few osseous loose bodies.   3.Chondrocalcinosis of the menisci with medial extrusion and fragmentation of the calcified medial meniscus.   4.Baker's cyst.                  Electronically Signed: Reji Sesay MD     4/27/2025 8:37 PM EDT     Workstation ID: [id number]      XR Knee 3 View Left   Final Result   Impression:   1.Large knee joint effusion. Question irregularity along the superior pole of the patella may represent nondisplaced fracture. Recommend correlation with point tenderness.   2.Moderate degenerative changes of the knee with multiple loose bodies.             Electronically Signed: Pedro  MD Yamileth     4/27/2025 6:29 PM EDT     Workstation ID: [id number]                                                           Medical Decision Making  Problems Addressed:  Atrial fibrillation with rapid ventricular response: complicated acute illness or injury  SVT (supraventricular tachycardia): complicated acute illness or injury    Amount and/or Complexity of Data Reviewed  Labs: ordered.  Radiology: ordered.  ECG/medicine tests: ordered. Decision-making details documented in ED Course.    Risk  Prescription drug management.  Decision regarding hospitalization.    Critical Care Time     The high probability of sudden, clinically significant deterioration in the patient's condition required the highest level of my preparedness to intervene urgently.  The services I provided to this patient were to treat and/or prevent clinically significant deterioration that could result in: Cardiovascular collapse and death. Services included the following: chart data review, reviewing nurses notes and/or old charts, documentation time, consultant collaboration regarding findings and treatment options, vital sign assessments and ordering, interpreting and reviewing diagnostic studies/lab tests.  Aggregate critical care time was 34 minutes, which includes only time during which I was engaged in work directly related to the patient's care, as described above, whether at the bedside or elsewhere in the Emergency Department. It did not include time spent performing other reported procedures or the services of residents, students, nurses or physician assistants.        My interpretation of the x-ray is no displaced fracture.  See system for radiology interpretation.    EKG interpretation #1, heart rate 117, sinus tach versus a flutter with 2-1 block, normal QTc, nonspecific ST changes    EKG interpretation #2, rate 115, sinus tachycardia versus a flutter with 2-1 block, nonspecific ST changes.    EKG interpretation #3, rate 218,  SVT, diffuse repolarization changes likely rate related    EKG interpretation #4, rate 210, SVT, diffuse repolarization changes likely rate related.    EKG interpretation #5: Rate 112, sinus tachycardia versus atrial flutter with 2-1 block, nonspecific ST changes.    Suspect patient in a flutter initially at a rate in the 110s .  Patient repeatedly going in to narrow complex tachycardia after CT of his knee.  Suspect SVT.  Converts with adenosine.  Patient going back into SVT despite metoprolol push and repeated adenosine doses.  Started on amiodarone.  Rate trending down.  Given his home pain medication and rate down to the 110s and 120s.  Discussed with Dr. Spicer.  Recommends continuing amiodarone and metoprolol for further rate control.  Admitted to hospitalist service.      Final diagnoses:   SVT (supraventricular tachycardia)   Atrial fibrillation with rapid ventricular response   Contusion of left knee, initial encounter       ED Disposition  ED Disposition       ED Disposition   Decision to Admit    Condition   --    Comment   Level of Care: Progressive Care [20]   Diagnosis: Arrhythmia [666703]   Admitting Physician: FREDDY SCHMIDT [[id number]]   Certification: I Certify That Inpatient Hospital Services Are Medically Necessary For Greater Than 2 Midnights                 No follow-up provider specified.       Medication List        ASK your doctor about these medications      donepezil 5 MG tablet  Commonly known as: ARICEPT  Ask about: Which instructions should I use?                 Herbie Dick MD  04/28/25 0137

## 2025-04-28 NOTE — PROGRESS NOTES
[facility] MEDICINE SERVICE  DAILY PROGRESS NOTE    NAME: Kamar Scott  : 1965  MRN: [id number]      LOS: 1 day     PROVIDER OF SERVICE: Paul Elliott MD    Chief Complaint: Arrhythmia    Subjective:     Interval History:  History taken from: patient    Doing well        Review of Systems:   Review of Systems    Objective:     Vital Signs  Temp:  [97.4 °F (36.3 °C)-98.9 °F (37.2 °C)] 97.4 °F (36.3 °C)  Heart Rate:  [] 75  Resp:  [16-24] 22  BP: (104-169)/() 104/64  Flow (L/min) (Oxygen Therapy):  [3] 3   Body mass index is 26.43 kg/m².    Physical Exam  Physical Exam  Constitutional:       Appearance: Normal appearance.   Cardiovascular:      Rate and Rhythm: Normal rate.      Pulses: Normal pulses.   Pulmonary:      Effort: Pulmonary effort is normal.      Breath sounds: Normal breath sounds.   Abdominal:      General: Abdomen is flat.      Palpations: Abdomen is soft.   Neurological:      Mental Status: He is alert.            Diagnostic Data    Results from last 7 days   Lab Units 25  1414 25  0155   WBC 10*3/mm3  --  10.30   HEMOGLOBIN g/dL  --  12.1*   HEMATOCRIT %  --  35.3*   PLATELETS 10*3/mm3  --  147   GLUCOSE mg/dL  --  175*   CREATININE mg/dL  --  1.29*   BUN mg/dL  --  17   SODIUM mmol/L  --  140   POTASSIUM mmol/L 3.2* 3.6   AST (SGOT) U/L  --  15   ALT (SGPT) U/L  --  12   ALK PHOS U/L  --  87   BILIRUBIN mg/dL  --  0.8   ANION GAP mmol/L  --  11.9       CT Head Without Contrast  Result Date: 2025  Impression: No acute intracranial abnormality. Electronically Signed: Reji Sesay MD  2025 1:29 AM EDT  Workstation ID: [id number]    CT Lower Extremity Left Without Contrast  Result Date: 2025  Impression: 1.No acute osseous abnormality. 2.Severe tricompartmental osteoarthritis with large knee joint effusion and a few osseous loose bodies. 3.Chondrocalcinosis of the menisci with medial extrusion and fragmentation of the calcified medial meniscus.  4.Baker's cyst. Electronically Signed: Reji Sesay MD  4/27/2025 8:37 PM EDT  Workstation ID: [id number]    XR Knee 3 View Left  Result Date: 4/27/2025  Impression: 1.Large knee joint effusion. Question irregularity along the superior pole of the patella may represent nondisplaced fracture. Recommend correlation with point tenderness. 2.Moderate degenerative changes of the knee with multiple loose bodies. Electronically Signed: Pedro Carson MD  4/27/2025 6:29 PM EDT  Workstation ID: [id number]        I reviewed the patient's new clinical results.    Assessment/Plan:     Active and Resolved Problems  Active Hospital Problems    Diagnosis  POA    Atrial flutter with rapid ventricular response [I48.92]  Unknown      Resolved Hospital Problems    Diagnosis Date Resolved POA    **Arrhythmia [I49.9] 04/28/2025 Yes       Assessments  Mechanical Fall  SVT on admission  History of A-fib on anticoagulation  CAD status post stent x 2  AG  Hypertension  Type 2 diabetes  Alzheimer dementia     Plan  - Difficulty controlled rates today. Ended up getting cardioverted with good results.  - DC adderal  -Psychiatry has seen patient and made new recommendations for his anxiety/irritability with early onset dementia.  - OK to resume diet after cardiversion  - SSI for type 2 diabetes  - Follow a.m. labs    VTE Prophylaxis:  Pharmacologic VTE prophylaxis orders are present.             Disposition Planning:     Barriers to Discharge:Stability  Anticipated Date of Discharge: 4/29?  Place of Discharge: Home      Time: 39 minutes     Code Status and Medical Interventions: CPR (Attempt to Resuscitate); Full Support   Ordered at: 04/28/25 0009     Code Status (Patient has no pulse and is not breathing):    CPR (Attempt to Resuscitate)     Medical Interventions (Patient has pulse or is breathing):    Full Support       Signature: Electronically signed by Paul Elliott MD, 04/28/25, 18:02 EDT.  [facility] Hospitalist Team

## 2025-04-28 NOTE — ANESTHESIA POSTPROCEDURE EVALUATION
Patient: Kamar Scott    Procedure Summary       Date: 04/28/25 Room / Location: [facility]    Anesthesia Start: 1637 Anesthesia Stop: 1649    Procedures:       ADULT TRANSESOPHAGEAL ECHO (KENIA) W/ CONT IF NECESSARY PER PROTOCOL      CARDIOVERSION EXTERNAL IN CARDIOLOGY DEPARTMENT Diagnosis:       Atrial flutter with rapid ventricular response      Atrial fibrillation, persistent      (Arrhythmia)      (Pre-cardioversion)      (flutter)    Scheduled Providers:  Provider: Giovanni Rosenbaum MD    Anesthesia Type: MAC ASA Status: 3 - Emergent            Anesthesia Type: MAC    Vitals  Vitals Value Taken Time   /64 04/28/25 17:00   Temp     Pulse 75 04/28/25 17:00   Resp     SpO2 100 % 04/28/25 17:00           Post Anesthesia Care and Evaluation    Patient location during evaluation: PACU  Patient participation: complete - patient participated  Level of consciousness: awake  Pain scale: See nurse's notes for pain score.  Pain management: adequate    Airway patency: patent  Anesthetic complications: No anesthetic complications  PONV Status: none  Cardiovascular status: acceptable  Respiratory status: acceptable and spontaneous ventilation  Hydration status: acceptable    Comments: Patient seen and examined postoperatively; vital signs stable; SpO2 greater than or equal to 90%; cardiopulmonary status stable; nausea/vomiting adequately controlled; pain adequately controlled; no apparent anesthesia complications; patient discharged from anesthesia care when discharge criteria were met

## 2025-04-28 NOTE — H&P
[facility] Medicine Services  History & Physical    Patient Name: Kamar Scott  : 1965  MRN: [id number]  Primary Care Physician:  Buddy Lafleur MD  Date of admission: 2025  Date and Time of Service: 2025 at 2125    Subjective      Chief Complaint: Fall    History of Present Illness: Kamar Scott is a 60 y.o. male with a CMH of Alzheimer dementia, A-fib on anticoagulation, CAD status post stent x 2, hypertension, type 2 diabetes, who presented to [facility] on 2025 with fall.    Patient is currently awake, alert, conversational, patient is forgetful, wife present at bedside who helps with the history.  Per the wife, patient was walking over the ramp, lost balance, which led to the fall, denies any loss of consciousness.  Patient denies any chest pain shortness of breath, however did report having intermittent episodes of palpitations.    In ED, patient with tachycardia blood pressure stable, EKG showing tachycardia, QRS 92s, heart rate 200s, labs remarkable for creatinine of 1.38, patient received adenosine in the ED, was started on amnio, heart rate improved to 120s, CT of the left lower extremity showing no acute osseous normality, severe compartmental osteoarthritis with large knee joint effusion and few osseous loose bodies, chondrocalcinosis of the meniscus with medial extrusion and fragmentation of the calcified medial meniscus, Baker's cyst.  Patient now admitted to medicine for further inpatient management    Review of Systems negative unless mentioned above    Personal History     Past Medical History:   Diagnosis Date    Hypertension     Peripheral neuropathy        Past Surgical History:   Procedure Laterality Date    CARDIAC SURGERY  1975    HERNIA REPAIR      KNEE ARTHROSCOPY      ORTHOPEDIC SURGERY      SHOULDER ROTATOR CUFF REPAIR      SPINAL FUSION         Family History: family history includes Dementia in his father; Neuropathy in his father.  Otherwise pertinent FHx was reviewed and not pertinent to current issue.    Social History:  reports that he has been smoking cigarettes. He does not have any smokeless tobacco history on file. He reports that he does not currently use alcohol. He reports current drug use. Drug: Marijuana.    Home Medications:  Prior to Admission Medications       Prescriptions Last Dose Informant Patient Reported? Taking?    apixaban (ELIQUIS) 5 MG tablet tablet   Yes No    Take 1 tablet by mouth.    aspirin 81 MG EC tablet   Yes No    Take 1 tablet by mouth Daily.    atorvastatin (LIPITOR) 10 MG tablet   Yes No    Take 1 tablet by mouth Daily.    clopidogrel (PLAVIX) 75 MG tablet   Yes No    Take 1 tablet by mouth Daily.    dilTIAZem CD (CARDIZEM CD) 300 MG 24 hr capsule   Yes No    Take 1 capsule by mouth every night at bedtime.    donepezil (ARICEPT) 10 MG tablet   No No    Take 0.5 tablets by mouth Every Night.    DULoxetine (CYMBALTA) 60 MG capsule   Yes No    Take 1 capsule by mouth Daily.    gabapentin (NEURONTIN) 300 MG capsule   Yes No    Take 1 capsule by mouth 2 (Two) Times a Day.    gemfibrozil (LOPID) 600 MG tablet   Yes No    Take 1 tablet by mouth 2 (Two) Times a Day.    Gemtesa 75 MG tablet   Yes No    Take 1 tablet by mouth Daily.    HYDROcodone-acetaminophen (NORCO)  MG per tablet   Yes No    TAKE 1 TABLET BY MOUTH EVERY 6 HOURS AS NEEDED FOR 30 DAYS    lisinopril (PRINIVIL,ZESTRIL) 2.5 MG tablet   Yes No    Take 1 tablet by mouth Daily.    LORazepam (ATIVAN) 1 MG tablet   Yes No    TAKE 1 TABLET EVERY DAY BY MOUTH AS NEEDED FOR 15 DAYS, FOR ANXIETY.    melatonin 5 MG sublingual tablet sublingual tablet   Yes No    Place 4 tablets under the tongue.    memantine (NAMENDA) 5 MG tablet   No No    Take 1 tablet by mouth 2 (Two) Times a Day.    metoprolol succinate XL (TOPROL-XL) 100 MG 24 hr tablet   Yes No    Take 1 tablet by mouth 2 (Two) Times a Day.    multivitamin with minerals tablet tablet   Yes No     Take 1 tablet by mouth Daily.    risperiDONE (risperDAL) 0.5 MG tablet   Yes No    take 1 tablet by mouth twice a day as directed    tamsulosin (FLOMAX) 0.4 MG capsule 24 hr capsule   Yes No    Take 1 capsule by mouth 2 (Two) Times a Day.              Allergies:  Allergies   Allergen Reactions    Penicillin G Rash       Objective      Vitals:   Temp:  [97.8 °F (36.6 °C)] 97.8 °F (36.6 °C)  Heart Rate:  [114-225] 161  Resp:  [18-24] 24  BP: (128-152)/() 134/78  Body mass index is 26.63 kg/m².  Physical Exam  General: Awake alert [place] x 3, conversation  HEENT: Atraumatic normocephalic  Cardio: Tachycardia, rhythm irregular  Respiratory: Clear to auscultation exam  Abdomen: Soft, nontender, no rebound or guarding  Extremities: No remarkable edema in the bilateral extremities, left knee joint swollen, no erythema noted  Neuro: Awake, alert, conversational, moves all extremities      Diagnostic Data:  Lab Results (last 24 hours)       Procedure Component Value Units Date/Time    TSH [[id number]]  (Normal) Collected: 04/27/25 1740    Specimen: Blood from Arm, Left Updated: 04/27/25 2107     TSH 2.220 uIU/mL     T4, Free [[id number]]  (Normal) Collected: 04/27/25 1740    Specimen: Blood from Arm, Left Updated: 04/27/25 2107     Free T4 1.13 ng/dL     Magnesium [[id number]]  (Normal) Collected: 04/27/25 1740    Specimen: Blood from Arm, Left Updated: 04/27/25 2107     Magnesium 1.6 mg/dL     Comprehensive Metabolic Panel [[id number]]  (Abnormal) Collected: 04/27/25 1740    Specimen: Blood from Arm, Left Updated: 04/27/25 1811     Glucose 145 mg/dL      BUN 20 mg/dL      Creatinine 1.38 mg/dL      Sodium 140 mmol/L      Potassium 3.9 mmol/L      Chloride 101 mmol/L      CO2 25.9 mmol/L      Calcium 9.8 mg/dL      Total Protein 7.7 g/dL      Albumin 4.7 g/dL      ALT (SGPT) 13 U/L      AST (SGOT) 15 U/L      Alkaline Phosphatase 97 U/L      Total Bilirubin 1.0 mg/dL      Globulin 3.0 gm/dL      A/G Ratio 1.6 g/dL       BUN/Creatinine Ratio 14.5     Anion Gap 13.1 mmol/L      eGFR 59.3 mL/min/1.73     Narrative:      GFR Categories in Chronic Kidney Disease (CKD)      GFR Category          GFR (mL/min/1.73)    Interpretation  G1                     90 or greater         Normal or high (1)  G2                      60-89                Mild decrease (1)  G3a                   45-59                Mild to moderate decrease  G3b                   30-44                Moderate to severe decrease  G4                    15-29                Severe decrease  G5                    14 or less           Kidney failure          (1)In the absence of evidence of kidney disease, neither GFR category G1 or G2 fulfill the criteria for CKD.    eGFR calculation 2021 CKD-EPI creatinine equation, which does not include race as a factor    CBC & Differential [[id number]]  (Abnormal) Collected: 04/27/25 1740    Specimen: Blood from Arm, Left Updated: 04/27/25 1750    Narrative:      The following orders were created for panel order CBC & Differential.  Procedure                               Abnormality         Status                     ---------                               -----------         ------                     CBC Auto Differential[[id number]]        Abnormal            Final result                 Please view results for these tests on the individual orders.    CBC Auto Differential [[id number]]  (Abnormal) Collected: 04/27/25 1740    Specimen: Blood from Arm, Left Updated: 04/27/25 1750     WBC 10.10 10*3/mm3      RBC 4.17 10*6/mm3      Hemoglobin 13.0 g/dL      Hematocrit 39.0 %      MCV 93.5 fL      MCH 31.2 pg      MCHC 33.3 g/dL      RDW 12.8 %      RDW-SD 44.0 fl      MPV 9.9 fL      Platelets 167 10*3/mm3      Neutrophil % 75.8 %      Lymphocyte % 13.1 %      Monocyte % 8.8 %      Eosinophil % 1.8 %      Basophil % 0.2 %      Immature Grans % 0.3 %      Neutrophils, Absolute 7.66 10*3/mm3      Lymphocytes, Absolute 1.32 10*3/mm3       Monocytes, Absolute 0.89 10*3/mm3      Eosinophils, Absolute 0.18 10*3/mm3      Basophils, Absolute 0.02 10*3/mm3      Immature Grans, Absolute 0.03 10*3/mm3      nRBC 0.0 /100 WBC              Imaging Results (Last 24 Hours)       Procedure Component Value Units Date/Time    CT Lower Extremity Left Without Contrast [[id number]] Collected: 04/27/25 2031     Updated: 04/27/25 2039    Narrative:      CT LOWER EXTREMITY LEFT WO CONTRAST    Date of Exam: 4/27/2025 8:03 PM EDT    Indication: abnormal x ray.    Comparison: None available.    Technique: Axial CT images were obtained of the left lower extremity without contrast administration.  Sagittal and coronal reconstructions were performed.  Automated exposure control and iterative reconstruction methods were used.      Findings:  There is no acute fracture or dislocation. Specifically, the superior aspect of the patella appears intact. There is significant enthesopathy and osteophyte formation, which likely accounts for the radiographic appearance. There is severe   tricompartmental osteophyte formation. There is moderate narrowing of the medial and lateral compartment joint spaces with approximately 5 mm medial subluxation of the distal femur with respect to the tibial plateau. The patellofemoral joint space is not   assessed due to the presence of a large knee joint effusion, which displaces the patella ventrally. There are a few osseous loose bodies within the posterior joint recess and layering dependently in the anterior joint space. The cruciate ligaments are   not assessed, but the pattern of calcification within the ligaments would suggest they are grossly intact. There is dense calcification noted throughout both menisci. The lateral meniscus appears normally located. The medial meniscus is nearly completely   extruded from the joint space medially with likely extensive chronic degenerative tearing. Patellar tendon appears intact. There is a Baker's cyst  measuring approximately 32 x 23 mm. There is mild subcutaneous edema. No obvious synovial proliferation.      Impression:      Impression:  1.No acute osseous abnormality.  2.Severe tricompartmental osteoarthritis with large knee joint effusion and a few osseous loose bodies.  3.Chondrocalcinosis of the menisci with medial extrusion and fragmentation of the calcified medial meniscus.  4.Baker's cyst.            Electronically Signed: Reji Sesay MD    4/27/2025 8:37 PM EDT    Workstation ID: [id number]    XR Knee 3 View Left [[id number]] Collected: 04/27/25 1827     Updated: 04/27/25 1831    Narrative:      XR KNEE 3 VW LEFT    Date of Exam: 4/27/2025 5:50 PM EDT    Indication: fall, swelling, pain    Comparison: 6/1/2015    Findings:  Large knee joint effusion. Chondrocalcinosis. Moderate degenerative change of the knee. Question irregularity along the superior pole of the patella may represent nondisplaced fracture. Multiple loose bodies seen. No traumatic malalignment. Articular   surface appear intact otherwise.      Impression:      Impression:  1.Large knee joint effusion. Question irregularity along the superior pole of the patella may represent nondisplaced fracture. Recommend correlation with point tenderness.  2.Moderate degenerative changes of the knee with multiple loose bodies.         Electronically Signed: Pedro Carson MD    4/27/2025 6:29 PM EDT    Workstation ID: [id number]              Assessment & Plan    Kamar Scott is a 60 y.o. male with a CMH of Alzheimer dementia, A-fib on anticoagulation, CAD status post stent x 2, hypertension, type 2 diabetes, who presented to [facility] on 4/27/2025 with fall.    Assessments  Mechanical Fall  SVT on admission  History of A-fib on anticoagulation  CAD status post stent x 2  AG  Hypertension  Type 2 diabetes  Alzheimer dementia    Plan  - Given adenosine in ED, initiated on Amio, will continue, cardiology consulted, will follow further  recommendation the morning  - Obtain CT of the head without contrast to rule out any acute pathologies, if negative will resume his home anticoagulation and Plavix  -CT of the right lower extremity showing no acute osseous abnormality,  severe compartmental osteoarthritis with knee joint effusion, otherwise afebrile  -Start normal saline 75 cc an hour gentle IV hydration, bladder scan to rule out retention, monitor strict ins and outs  - SSI for type 2 diabetes  - Resume other home medication once reconciled per pharmacy or medically appropriate  - Follow a.m. labs    VTE Prophylaxis:  No VTE prophylaxis order currently exists.    CODE STATUS: Full     I discussed the patient's findings and my recommendations with patient.      This document has been electronically signed by Gavin Barker MD on April 27, 2025 21:25 EDT   [facility] Hospitalist Team

## 2025-04-28 NOTE — ANESTHESIA PREPROCEDURE EVALUATION
Anesthesia Evaluation     NPO Solid Status: Waived due to emergency  NPO Liquid Status: > 2 hours           Airway   Mallampati: I  TM distance: >3 FB  Neck ROM: full  No difficulty expected  Dental - normal exam     Pulmonary - normal exam   Cardiovascular - normal exam    (+) hypertension, dysrhythmias Atrial Fib, Tachycardia      Neuro/Psych  (+) numbness, psychiatric history, dementia  GI/Hepatic/Renal/Endo      Musculoskeletal     Abdominal  - normal exam    Bowel sounds: normal.   Substance History      OB/GYN          Other                    Anesthesia Plan    ASA 3 - emergent     MAC   total IV anesthesia  intravenous induction     Anesthetic plan, risks, benefits, and alternatives have been provided, discussed and informed consent has been obtained with: patient.  Pre-procedure education provided  Plan discussed with CRNA.    CODE STATUS:

## 2025-04-28 NOTE — PROCEDURES
Procedure: DC cardioversion    : Sarah osborn MD    Date of procedure: 4/28/2025    Indications: Atrial flutter with rapid ventricular response.    Details of the procedure:    Consent was signed, timeout was performed.  KENIA was performed prior to cardioversion, reported separately.  Defibrillator pads were placed on the patient's chest and back.  Sedation was performed by anesthesia department.  After adequate sedation was ensured KENIA was performed followed by cardioversion.  Successful cardioversion with delivery of 200 J of biphasic current synchronized with the R waves x 1, return to sinus rhythm.    Recommendations:    Will switch IV amiodarone drip to p.o. amiodarone 200 mg twice daily  Continue Eliquis.      Electronically signed by Sarah Osborn MD, 04/28/25, 5:10 PM EDT.

## 2025-04-28 NOTE — CONSULTS
cardiology [place]    Subjective:     Encounter Date:04/27/2025      Patient ID: Kamar Scott is a 60 y.o. male     Referring Physician: Lexi    Chief Complaint: Lightheadedness    Reason for Consult: tachycardia    Seen and examined greater than 50% of total encounter time in medical decision making performed by me, seen at bedside, family member present as well    HPI:  Kamar Scott is a 60 y.o. male with a history of valve surgery age 9, MI age 28 with stents, paroxysmal A-fib, early onset Alzheimer's, DM2 and benign essential hypertension who presents with lightheadedness.  Patient and significant other at bedside tell me that yesterday he was moving furniture and his knee started to hurt, he lowered himself to the ground.  He stated that he was fatigued.  He stated that the left leg was swollen, which had been coughing over the last several days.  Large effusion of the left knee was noted in ED.  Patient was noted in the ED to be in SVT.  He was given adenosine, amiodarone drip initiated.    Patient was seen at [facility] 1 month ago with complaints of neck pain and in the ED, EKG showed ST depression.  He was to have ischemic evaluation, however states he left AMA.    Patient seen this morning lying in bed in no acute distress heart rates 110, tearful and anxious.  He denies chest pain or shortness of breath, MOYA, orthopnea or PND.  He states fatigue has been increasing over the past 6 months.  No episodes of vertigo or syncope reported. Continues on Amio drip, rate hanging around 110.      Review of systems otherwise -14 point review of systems except as mentioned above  Historical data copied forward from previous encounters in EMR is unchanged      Past Medical History:   Diagnosis Date    Hypertension     Peripheral neuropathy        Past Surgical History:   Procedure Laterality Date    CARDIAC SURGERY  1975    HERNIA REPAIR      KNEE ARTHROSCOPY      ORTHOPEDIC SURGERY      SHOULDER ROTATOR  "CUFF REPAIR      SPINAL FUSION         Family History   Problem Relation Age of Onset    Neuropathy Father     Dementia Father        Social History     Socioeconomic History    Marital status:    Tobacco Use    Smoking status: Every Day     Types: Cigarettes   Vaping Use    Vaping status: Never Used   Substance and Sexual Activity    Alcohol use: Not Currently    Drug use: Yes     Types: Marijuana    Sexual activity: Defer         Review of Systems   Constitutional: Positive for malaise/fatigue. Negative for diaphoresis.   Cardiovascular:  Negative for chest pain, claudication, cyanosis, dyspnea on exertion, irregular heartbeat, leg swelling, near-syncope, orthopnea, palpitations, paroxysmal nocturnal dyspnea and syncope.   Respiratory:  Negative for cough, hemoptysis and sleep disturbances due to breathing.    Hematologic/Lymphatic: Negative for bleeding problem.   Skin:  Negative for color change.   Musculoskeletal:  Positive for joint pain and joint swelling. Negative for falls.        L knee   Neurological:  Positive for light-headedness and weakness. Negative for dizziness, focal weakness, headaches, loss of balance, numbness, paresthesias and vertigo.   Psychiatric/Behavioral:  Negative for altered mental status.    All other systems reviewed and are negative.         Objective:         BP (!) 138/101   Pulse 108   Temp 98.2 °F (36.8 °C) (Oral)   Resp 16   Ht 175.3 cm (69\")   Wt 81.6 kg (179 lb 14.3 oz)   SpO2 97%   BMI 26.57 kg/m²     Physical Exam:  Physical Exam    General Appearance:    Alert, cooperative, in no acute distress   Head:    Normocephalic, without obvious abnormality, atraumatic   Eyes:            PERRLA, EOM intact, conjunctivae and sclerae normal, no  icterus       Throat:   Oral mucosa moist, No oral lesions, no thrush   Neck:   No carotid bruit, no JVD, supple, trachea midline, no thyromegaly    Lungs:     Clear but coarse in bases, respirations regular, even and   unlabored " "   Heart:    Regular rhythm and tachycardic rate, normal S1 and S2, no gallop, no rub, no click   Chest Wall:    No abnormalities observed   Abdomen:     Soft, obese, nontender, nondistended, no guarding, no rebound  tenderness   Extremities:   L knee/leg edema, no cyanosis or redness   Pulses:   Pulses palpable and equal bilaterally in all extremities   Skin:   No bleeding, bruising or rash       Neurologic:   Flat affect, impaired memory, tearful at times           ASCVD Risk Score::  The ASCVD Risk score (Randy VENTURA, et al., 2019) failed to calculate for the following reasons:    Cannot find a previous HDL lab    Cannot find a previous total cholesterol lab      Lab Review:     Results from last 7 days   Lab Units 04/28/25  0155 04/27/25  1740   SODIUM mmol/L 140 140   POTASSIUM mmol/L 3.6 3.9   CHLORIDE mmol/L 104 101   CO2 mmol/L 24.1 25.9   BUN mg/dL 17 20   CREATININE mg/dL 1.29* 1.38*   GLUCOSE mg/dL 175* 145*   CALCIUM mg/dL 9.0 9.8   AST (SGOT) U/L 15 15   ALT (SGPT) U/L 12 13         Results from last 7 days   Lab Units 04/28/25  0155 04/27/25  1740   WBC 10*3/mm3 10.30 10.10   HEMOGLOBIN g/dL 12.1* 13.0   HEMATOCRIT % 35.3* 39.0   PLATELETS 10*3/mm3 147 167         Results from last 7 days   Lab Units 04/28/25  0155 04/27/25  1740   MAGNESIUM mg/dL 1.7 1.6           Invalid input(s): \"LDLCALC\"      Results from last 7 days   Lab Units 04/27/25  1740   TSH uIU/mL 2.220       Recent Radiology:  Imaging Results (Most Recent)       Procedure Component Value Units Date/Time    CT Head Without Contrast [[id number]] Collected: 04/28/25 0128     Updated: 04/28/25 0131    Narrative:      CT HEAD WO CONTRAST    Date of Exam: 4/27/2025 10:59 PM EDT    Indication: fall, on anticoagulation at home, r/o acute pathologies before initation of anticoagulation.    Comparison: 1/11/2024 and brain MRI 2/3/2025.    Technique: Axial CT images were obtained of the head without contrast administration.  Coronal reconstructions " were performed.  Automated exposure control and iterative reconstruction methods were used.      Findings:  Superficial soft tissues appear within normal limits. The calvarium is intact.  Paranasal sinuses and mastoid air cells appear well aerated.  Orbits are unremarkable.  There is no acute intracranial hemorrhage.  No mass effect or midline shift.  No   abnormal extra-axial collections.  Gray-white differentiation is within normal limits.  There are no focal hypoattenuating lesions.  Ventricular size and configuration is normal for age.      Impression:      Impression:  No acute intracranial abnormality.          Electronically Signed: Reji Sesay MD    4/28/2025 1:29 AM EDT    Workstation ID: [id number]    CT Lower Extremity Left Without Contrast [[id number]] Collected: 04/27/25 2031     Updated: 04/27/25 2039    Narrative:      CT LOWER EXTREMITY LEFT WO CONTRAST    Date of Exam: 4/27/2025 8:03 PM EDT    Indication: abnormal x ray.    Comparison: None available.    Technique: Axial CT images were obtained of the left lower extremity without contrast administration.  Sagittal and coronal reconstructions were performed.  Automated exposure control and iterative reconstruction methods were used.      Findings:  There is no acute fracture or dislocation. Specifically, the superior aspect of the patella appears intact. There is significant enthesopathy and osteophyte formation, which likely accounts for the radiographic appearance. There is severe   tricompartmental osteophyte formation. There is moderate narrowing of the medial and lateral compartment joint spaces with approximately 5 mm medial subluxation of the distal femur with respect to the tibial plateau. The patellofemoral joint space is not   assessed due to the presence of a large knee joint effusion, which displaces the patella ventrally. There are a few osseous loose bodies within the posterior joint recess and layering dependently in the anterior joint  space. The cruciate ligaments are   not assessed, but the pattern of calcification within the ligaments would suggest they are grossly intact. There is dense calcification noted throughout both menisci. The lateral meniscus appears normally located. The medial meniscus is nearly completely   extruded from the joint space medially with likely extensive chronic degenerative tearing. Patellar tendon appears intact. There is a Baker's cyst measuring approximately 32 x 23 mm. There is mild subcutaneous edema. No obvious synovial proliferation.      Impression:      Impression:  1.No acute osseous abnormality.  2.Severe tricompartmental osteoarthritis with large knee joint effusion and a few osseous loose bodies.  3.Chondrocalcinosis of the menisci with medial extrusion and fragmentation of the calcified medial meniscus.  4.Baker's cyst.            Electronically Signed: Reji Sesay MD    4/27/2025 8:37 PM EDT    Workstation ID: [id number]    XR Knee 3 View Left [[id number]] Collected: 04/27/25 1827     Updated: 04/27/25 1831    Narrative:      XR KNEE 3 VW LEFT    Date of Exam: 4/27/2025 5:50 PM EDT    Indication: fall, swelling, pain    Comparison: 6/1/2015    Findings:  Large knee joint effusion. Chondrocalcinosis. Moderate degenerative change of the knee. Question irregularity along the superior pole of the patella may represent nondisplaced fracture. Multiple loose bodies seen. No traumatic malalignment. Articular   surface appear intact otherwise.      Impression:      Impression:  1.Large knee joint effusion. Question irregularity along the superior pole of the patella may represent nondisplaced fracture. Recommend correlation with point tenderness.  2.Moderate degenerative changes of the knee with multiple loose bodies.         Electronically Signed: Pedro Carson MD    4/27/2025 6:29 PM EDT    Workstation ID: [id number]              ECHOCARDIOGRAM:      Stress Test:        Cardiac Catheterization:  No results  found for this or any previous visit.      Results Review:  I have personally reviewed the results from the time of this admission to 4/28/2025 10:01 EDT and agree with these findings:  []  Laboratory  []  Microbiology  []  Radiology  []  EKG/Telemetry   []  Cardiology/Vascular   []  Pathology  []  Old records  []  Other:    Most notable findings include:     Allergies   Allergen Reactions    Penicillin G Rash       Current Medications:   Scheduled Meds:donepezil, 5 mg, Oral, Nightly  memantine, 5 mg, Oral, BID  metoprolol tartrate, , ,   mupirocin, 1 Application, Each Nare, BID  sodium chloride, 10 mL, Intravenous, Q12H      Continuous Infusions:amiodarone, 1 mg/min, Last Rate: 1 mg/min (04/28/25 0938)            Assessment:         Active Hospital Problems    Diagnosis  POA    **Arrhythmia [I49.9]  Yes          Plan:   Paroxysmal SVT  paroxysmal A-fib  On Amio gtt at 1mg/min, rates continue to be tachycardic  Consult EP  Restart home Toprol and Eliquis  Patient and significant other report uninterrupted Eliquis    CAD  MI age 28 with stents   Restart Plavix and Lipitor    Essential hypertension   Restart home metoprolol  mg daily, no home lisinopril at this time    Plan  Continue amiodarone drip  Digoxin  Beta-blocker  Resume antihypertensives  Consult EP  Consider ischemic eval when rates better controlled    With prior non-STEMI could consider invasive ischemic evaluation at a later date  Chest pain-free today       Chris Spicer MD, PhD  Kaylie Moran, ALF  04/28/25  10:01 EDT

## 2025-04-29 ENCOUNTER — READMISSION MANAGEMENT (OUTPATIENT)
Dept: CALL CENTER | Facility: HOSPITAL | Age: 60
End: 2025-04-29
Payer: MEDICARE

## 2025-04-29 ENCOUNTER — APPOINTMENT (OUTPATIENT)
Dept: RESPIRATORY THERAPY | Facility: HOSPITAL | Age: 60
End: 2025-04-29
Payer: MEDICARE

## 2025-04-29 VITALS
HEART RATE: 84 BPM | OXYGEN SATURATION: 98 % | HEIGHT: 69 IN | DIASTOLIC BLOOD PRESSURE: 96 MMHG | SYSTOLIC BLOOD PRESSURE: 156 MMHG | BODY MASS INDEX: 26.51 KG/M2 | TEMPERATURE: 98.2 F | WEIGHT: 179 LBS | RESPIRATION RATE: 22 BRPM

## 2025-04-29 PROCEDURE — 99232 SBSQ HOSP IP/OBS MODERATE 35: CPT | Performed by: INTERNAL MEDICINE

## 2025-04-29 PROCEDURE — 25010000002 HYDROMORPHONE PER 4 MG: Performed by: STUDENT IN AN ORGANIZED HEALTH CARE EDUCATION/TRAINING PROGRAM

## 2025-04-29 RX ORDER — METOPROLOL SUCCINATE 100 MG/1
100 TABLET, EXTENDED RELEASE ORAL
Qty: 30 TABLET | Refills: 0 | Status: SHIPPED | OUTPATIENT
Start: 2025-04-30

## 2025-04-29 RX ORDER — AMIODARONE HYDROCHLORIDE 200 MG/1
200 TABLET ORAL EVERY 12 HOURS SCHEDULED
Qty: 60 TABLET | Refills: 0 | Status: SHIPPED | OUTPATIENT
Start: 2025-04-29

## 2025-04-29 RX ADMIN — AMIODARONE HYDROCHLORIDE 200 MG: 200 TABLET ORAL at 08:10

## 2025-04-29 RX ADMIN — CLOPIDOGREL BISULFATE 75 MG: 75 TABLET, FILM COATED ORAL at 08:05

## 2025-04-29 RX ADMIN — HYDROMORPHONE HYDROCHLORIDE 0.5 MG: 1 INJECTION, SOLUTION INTRAMUSCULAR; INTRAVENOUS; SUBCUTANEOUS at 09:34

## 2025-04-29 RX ADMIN — LORAZEPAM 1 MG: 1 TABLET ORAL at 02:48

## 2025-04-29 RX ADMIN — HYDROMORPHONE HYDROCHLORIDE 0.5 MG: 1 INJECTION, SOLUTION INTRAMUSCULAR; INTRAVENOUS; SUBCUTANEOUS at 02:48

## 2025-04-29 RX ADMIN — LORAZEPAM 1 MG: 1 TABLET ORAL at 10:48

## 2025-04-29 RX ADMIN — MEMANTINE HYDROCHLORIDE 5 MG: 10 TABLET, FILM COATED ORAL at 08:04

## 2025-04-29 RX ADMIN — METOPROLOL SUCCINATE 100 MG: 50 TABLET, EXTENDED RELEASE ORAL at 08:10

## 2025-04-29 RX ADMIN — Medication 10 ML: at 08:07

## 2025-04-29 RX ADMIN — APIXABAN 5 MG: 5 TABLET, FILM COATED ORAL at 08:05

## 2025-04-29 RX ADMIN — BUSPIRONE HYDROCHLORIDE 5 MG: 5 TABLET ORAL at 08:10

## 2025-04-29 RX ADMIN — MUPIROCIN 1 APPLICATION: 20 OINTMENT TOPICAL at 08:06

## 2025-04-29 NOTE — CASE MANAGEMENT/SOCIAL WORK
Case Management Discharge Note      Final Note: CM reviewed chart documentation for clinical updates. DC orders in place. Current on Eliquis at home for AC plan and will continue. No additional service needs identified. No barriers.      Transportation Services  Private: Car (with spouse)    Final Discharge Disposition Code: 01 - home or self-care    Tricia Salas, RN    Office Phone: [phone]  Office Cell:     [phone]

## 2025-04-29 NOTE — PROGRESS NOTES
"    Reason for follow-up: Atrial flutter     Patient Care Team:  Buddy Lafleur MD as PCP - General (Internal Medicine)    Subjective .   Kamar Scott doing well     ROS    Penicillin g    Scheduled Meds:amiodarone, 200 mg, Oral, Q12H  apixaban, 5 mg, Oral, Q12H  atorvastatin, 40 mg, Oral, Nightly  busPIRone, 5 mg, Oral, Q12H  clopidogrel, 75 mg, Oral, Daily  donepezil, 5 mg, Oral, Nightly  melatonin, 5 mg, Oral, Nightly  memantine, 5 mg, Oral, BID  metoprolol succinate XL, 100 mg, Oral, Q24H  mupirocin, 1 Application, Each Nare, BID  sodium chloride, 10 mL, Intravenous, Q12H      Continuous Infusions:   PRN Meds:.  senna-docusate sodium **AND** polyethylene glycol **AND** bisacodyl **AND** bisacodyl    Calcium Replacement - Follow Nurse / BPA Driven Protocol    HYDROmorphone    LORazepam    Magnesium Standard Dose Replacement - Follow Nurse / BPA Driven Protocol    metoprolol tartrate    nitroglycerin    Phosphorus Replacement - Follow Nurse / BPA Driven Protocol    Potassium Replacement - Follow Nurse / BPA Driven Protocol    [COMPLETED] Insert Peripheral IV **AND** sodium chloride    sodium chloride    sodium chloride      VITAL SIGNS  Vitals:    04/29/25 0100 04/29/25 0400 04/29/25 0800 04/29/25 0810   BP: 155/97 120/72 157/98 157/98   Pulse: 97 65 96 89   Resp:       Temp:  98.4 °F (36.9 °C) 98.3 °F (36.8 °C)    TempSrc:  Axillary Oral    SpO2: 98% 97%     Weight:       Height:           Flowsheet Rows      Flowsheet Row First Filed Value   Admission Height 175.3 cm (69\") Documented at 04/27/2025 1635   Admission Weight 81.8 kg (180 lb 5.4 oz) Documented at 04/27/2025 1635               Physical Exam  VITALS REVIEWED    General:      well developed, in no acute distress.    Head:      normocephalic and atraumatic.    Eyes:      PERRL/EOM intact, conjunctiva and sclera clear with out nystagmus.    Neck:      no masses, thyromegaly,  trachea central with normal respiratory effort and PMI displaced " laterally  Lungs:      Clear  Heart:       Regular rate and rhythm  Msk:      no deformity or scoliosis noted of thoracic or lumbar spine.    Pulses:      pulses normal in all 4 extremities.    Extremities:       No lower extremity edema  Neurologic:      no focal deficits.   alert oriented x3  Skin:      intact without lesions or rashes.    Psych:      alert and cooperative; normal mood and affect; normal attention span and concentration.          LAB RESULTS (LAST 7 DAYS)    CBC  Results from last 7 days   Lab Units 04/28/25 0155 04/27/25  1740   WBC 10*3/mm3 10.30 10.10   RBC 10*6/mm3 3.84* 4.17   HEMOGLOBIN g/dL 12.1* 13.0   HEMATOCRIT % 35.3* 39.0   MCV fL 91.9 93.5   PLATELETS 10*3/mm3 147 167       BMP  Results from last 7 days   Lab Units 04/28/25 2332 04/28/25  1414 04/28/25 0155 04/27/25  1740   SODIUM mmol/L  --   --  140 140   POTASSIUM mmol/L 3.9 3.2* 3.6 3.9   CHLORIDE mmol/L  --   --  104 101   CO2 mmol/L  --   --  24.1 25.9   BUN mg/dL  --   --  17 20   CREATININE mg/dL  --   --  1.29* 1.38*   GLUCOSE mg/dL  --   --  175* 145*   MAGNESIUM mg/dL  --   --  1.7 1.6   PHOSPHORUS mg/dL  --   --  2.5  --        CMP   Results from last 7 days   Lab Units 04/28/25 2332 04/28/25 1414 04/28/25 0155 04/27/25  1740   SODIUM mmol/L  --   --  140 140   POTASSIUM mmol/L 3.9 3.2* 3.6 3.9   CHLORIDE mmol/L  --   --  104 101   CO2 mmol/L  --   --  24.1 25.9   BUN mg/dL  --   --  17 20   CREATININE mg/dL  --   --  1.29* 1.38*   GLUCOSE mg/dL  --   --  175* 145*   ALBUMIN g/dL  --   --  4.2 4.7   BILIRUBIN mg/dL  --   --  0.8 1.0   ALK PHOS U/L  --   --  87 97   AST (SGOT) U/L  --   --  15 15   ALT (SGPT) U/L  --   --  12 13         BNP        TROPONIN        CoAg        Creatinine Clearance  Estimated Creatinine Clearance: 71.7 mL/min (A) (by C-G formula based on SCr of 1.29 mg/dL (H)).    ABG          EKG    I personally reviewed the patient's EKG/Telemetry data: Sinus rhythm      Assessment & Plan       Atrial  flutter with rapid ventricular response      Kamar Scott is a 60-year-old male patient who has history of coronary artery disease, atrial flutter, previous cardioversion according to patient.  Patient follows at [facility].  He presented to the hospital due to swelling and pain in his left knee and he was found to be in atrial flutter heart rates going up to 200 bpm during one-to-one conduction episodes.  He is started on IV amiodarone but remains in atrial flutter.  He is symptomatic with shortness of breath.  He said that he was started on Eliquis about a week ago.  I will go ahead and perform KENIA guided cardioversion, and keep him on amiodarone.  After that we can either schedule him for ablation or through his primary cardiologist.    4/29/2025  KENIA guided cardioversion was performed, patient in sinus rhythm.  Continue p.o. amiodarone and Eliquis.  Patient would like to follow-up with his primary cardiologist.    I discussed the patients findings and my recommendations with patient and with outlined plan    Sarah Ortega MD  04/29/25  08:45 EDT      Electronically signed by Sarah Ortega MD, 04/29/25, 8:46 AM EDT.

## 2025-04-29 NOTE — CONSULTS
cardiology [place]    Subjective:     Encounter Date:04/27/2025      Patient ID: Kamar Scott is a 60 y.o. male     Referring Physician: Lexi    Chief Complaint: Lightheadedness    Reason for Consult: tachycardia    Seen and examined greater than 50% of total encounter time in medical decision making performed by me, seen at bedside, family member present as well    HPI:  Kamar Scott is a 60 y.o. male with a history of valve surgery age 9, MI age 28 with stents, paroxysmal A-fib, early onset Alzheimer's, DM2 and benign essential hypertension who presents with lightheadedness.  Patient and significant other at bedside tell me that yesterday he was moving furniture and his knee started to hurt, he lowered himself to the ground.  He stated that he was fatigued.  He stated that the left leg was swollen, which had been coughing over the last several days.  Large effusion of the left knee was noted in ED.  Patient was noted in the ED to be in SVT.  He was given adenosine, amiodarone drip initiated.    Patient was seen at [facility] 1 month ago with complaints of neck pain and in the ED, EKG showed ST depression.  He was to have ischemic evaluation, however states he left AMA.    Patient seen this morning lying in bed in no acute distress heart rates 110, tearful and anxious.  He denies chest pain or shortness of breath, MOYA, orthopnea or PND.  He states fatigue has been increasing over the past 6 months.  No episodes of vertigo or syncope reported. Continues on Amio drip, rate hanging around 110.    =======================================================  Status post DC cardioversion  Sinus rhythm 60s  Blood pressure 120 systolic  Chest pain-free  Overngiht event d/w staff    Review of systems otherwise -14 point review of systems except as mentioned above  Historical data copied forward from previous encounters in EMR is unchanged      Past Medical History:   Diagnosis Date    Hypertension     Peripheral  "neuropathy        Past Surgical History:   Procedure Laterality Date    CARDIAC SURGERY  1975    HERNIA REPAIR      KNEE ARTHROSCOPY      ORTHOPEDIC SURGERY      SHOULDER ROTATOR CUFF REPAIR      SPINAL FUSION         Family History   Problem Relation Age of Onset    Neuropathy Father     Dementia Father        Social History     Socioeconomic History    Marital status:    Tobacco Use    Smoking status: Every Day     Types: Cigarettes   Vaping Use    Vaping status: Never Used   Substance and Sexual Activity    Alcohol use: Not Currently    Drug use: Yes     Types: Marijuana    Sexual activity: Defer         Review of Systems   Constitutional: Positive for malaise/fatigue. Negative for diaphoresis.   Cardiovascular:  Negative for chest pain, claudication, cyanosis, dyspnea on exertion, irregular heartbeat, leg swelling, near-syncope, orthopnea, palpitations, paroxysmal nocturnal dyspnea and syncope.   Respiratory:  Negative for cough, hemoptysis and sleep disturbances due to breathing.    Hematologic/Lymphatic: Negative for bleeding problem.   Skin:  Negative for color change.   Musculoskeletal:  Positive for joint pain and joint swelling. Negative for falls.        L knee   Neurological:  Positive for light-headedness and weakness. Negative for dizziness, focal weakness, headaches, loss of balance, numbness, paresthesias and vertigo.   Psychiatric/Behavioral:  Negative for altered mental status.    All other systems reviewed and are negative.         Objective:         /72   Pulse 65   Temp 98.4 °F (36.9 °C) (Axillary)   Resp 22   Ht 175.3 cm (69\")   Wt 81.2 kg (179 lb)   SpO2 97%   BMI 26.43 kg/m²     Physical Exam:  Physical Exam    General Appearance:    Alert, cooperative, in no acute distress   Head:    Normocephalic, without obvious abnormality, atraumatic   Eyes:            PERRLA, EOM intact, conjunctivae and sclerae normal, no  icterus       Throat:   Oral mucosa moist, No oral lesions, " "no thrush   Neck:   No carotid bruit, no JVD, supple, trachea midline, no thyromegaly    Lungs:     Clear but coarse in bases, respirations regular, even and   unlabored    Heart:    Regular rhythm nsr normal S1 and S2, no gallop, no rub, no click   Chest Wall:    No abnormalities observed   Abdomen:     Soft, obese, nontender, nondistended, no guarding, no rebound  tenderness   Extremities:   L knee/leg edema, no cyanosis or redness   Pulses:   Pulses palpable and equal bilaterally in all extremities   Skin:   No bleeding, bruising or rash       Neurologic:   Flat affect, impaired memory, tearful at times           ASCVD Risk Score::  The ASCVD Risk score (Randy DK, et al., 2019) failed to calculate for the following reasons:    Cannot find a previous HDL lab    Cannot find a previous total cholesterol lab      Lab Review:     Results from last 7 days   Lab Units 04/28/25  2332 04/28/25  1414 04/28/25  0155 04/27/25  1740   SODIUM mmol/L  --   --  140 140   POTASSIUM mmol/L 3.9 3.2* 3.6 3.9   CHLORIDE mmol/L  --   --  104 101   CO2 mmol/L  --   --  24.1 25.9   BUN mg/dL  --   --  17 20   CREATININE mg/dL  --   --  1.29* 1.38*   GLUCOSE mg/dL  --   --  175* 145*   CALCIUM mg/dL  --   --  9.0 9.8   AST (SGOT) U/L  --   --  15 15   ALT (SGPT) U/L  --   --  12 13         Results from last 7 days   Lab Units 04/28/25  0155 04/27/25  1740   WBC 10*3/mm3 10.30 10.10   HEMOGLOBIN g/dL 12.1* 13.0   HEMATOCRIT % 35.3* 39.0   PLATELETS 10*3/mm3 147 167         Results from last 7 days   Lab Units 04/28/25  0155 04/27/25  1740   MAGNESIUM mg/dL 1.7 1.6           Invalid input(s): \"LDLCALC\"      Results from last 7 days   Lab Units 04/27/25  1740   TSH uIU/mL 2.220       Recent Radiology:  Imaging Results (Most Recent)       Procedure Component Value Units Date/Time    CT Head Without Contrast [[id number]] Collected: 04/28/25 0128     Updated: 04/28/25 0131    Narrative:      CT HEAD WO CONTRAST    Date of Exam: 4/27/2025 10:59 " PM EDT    Indication: fall, on anticoagulation at home, r/o acute pathologies before initation of anticoagulation.    Comparison: 1/11/2024 and brain MRI 2/3/2025.    Technique: Axial CT images were obtained of the head without contrast administration.  Coronal reconstructions were performed.  Automated exposure control and iterative reconstruction methods were used.      Findings:  Superficial soft tissues appear within normal limits. The calvarium is intact.  Paranasal sinuses and mastoid air cells appear well aerated.  Orbits are unremarkable.  There is no acute intracranial hemorrhage.  No mass effect or midline shift.  No   abnormal extra-axial collections.  Gray-white differentiation is within normal limits.  There are no focal hypoattenuating lesions.  Ventricular size and configuration is normal for age.      Impression:      Impression:  No acute intracranial abnormality.          Electronically Signed: Reji Sesay MD    4/28/2025 1:29 AM EDT    Workstation ID: [id number]    CT Lower Extremity Left Without Contrast [[id number]] Collected: 04/27/25 2031     Updated: 04/27/25 2039    Narrative:      CT LOWER EXTREMITY LEFT WO CONTRAST    Date of Exam: 4/27/2025 8:03 PM EDT    Indication: abnormal x ray.    Comparison: None available.    Technique: Axial CT images were obtained of the left lower extremity without contrast administration.  Sagittal and coronal reconstructions were performed.  Automated exposure control and iterative reconstruction methods were used.      Findings:  There is no acute fracture or dislocation. Specifically, the superior aspect of the patella appears intact. There is significant enthesopathy and osteophyte formation, which likely accounts for the radiographic appearance. There is severe   tricompartmental osteophyte formation. There is moderate narrowing of the medial and lateral compartment joint spaces with approximately 5 mm medial subluxation of the distal femur with respect to  the tibial plateau. The patellofemoral joint space is not   assessed due to the presence of a large knee joint effusion, which displaces the patella ventrally. There are a few osseous loose bodies within the posterior joint recess and layering dependently in the anterior joint space. The cruciate ligaments are   not assessed, but the pattern of calcification within the ligaments would suggest they are grossly intact. There is dense calcification noted throughout both menisci. The lateral meniscus appears normally located. The medial meniscus is nearly completely   extruded from the joint space medially with likely extensive chronic degenerative tearing. Patellar tendon appears intact. There is a Baker's cyst measuring approximately 32 x 23 mm. There is mild subcutaneous edema. No obvious synovial proliferation.      Impression:      Impression:  1.No acute osseous abnormality.  2.Severe tricompartmental osteoarthritis with large knee joint effusion and a few osseous loose bodies.  3.Chondrocalcinosis of the menisci with medial extrusion and fragmentation of the calcified medial meniscus.  4.Baker's cyst.            Electronically Signed: Reji Sesay MD    4/27/2025 8:37 PM EDT    Workstation ID: [id number]    XR Knee 3 View Left [[id number]] Collected: 04/27/25 1827     Updated: 04/27/25 1831    Narrative:      XR KNEE 3 VW LEFT    Date of Exam: 4/27/2025 5:50 PM EDT    Indication: fall, swelling, pain    Comparison: 6/1/2015    Findings:  Large knee joint effusion. Chondrocalcinosis. Moderate degenerative change of the knee. Question irregularity along the superior pole of the patella may represent nondisplaced fracture. Multiple loose bodies seen. No traumatic malalignment. Articular   surface appear intact otherwise.      Impression:      Impression:  1.Large knee joint effusion. Question irregularity along the superior pole of the patella may represent nondisplaced fracture. Recommend correlation with point  tenderness.  2.Moderate degenerative changes of the knee with multiple loose bodies.         Electronically Signed: Pedro Carson MD    4/27/2025 6:29 PM EDT    Workstation ID: [id number]              ECHOCARDIOGRAM:  Results for orders placed during the hospital encounter of 04/27/25    Adult Transthoracic Echo Complete W/ Cont if Necessary Per Protocol    Interpretation Summary    Left ventricular systolic function is normal. Left ventricular ejection fraction appears to be 56 - 60%.    Left ventricular wall thickness is consistent with mild concentric hypertrophy.    Left ventricular diastolic dysfunction is noted.    The right ventricular cavity is mildly dilated.    The left atrial cavity is mildly dilated.    Estimated right ventricular systolic pressure from tricuspid regurgitation is normal (<35 mmHg).      Stress Test:        Cardiac Catheterization:  No results found for this or any previous visit.      Results Review:  I have personally reviewed the results from the time of this admission to 4/29/2025 07:46 EDT and agree with these findings:  []  Laboratory  []  Microbiology  []  Radiology  []  EKG/Telemetry   []  Cardiology/Vascular   []  Pathology  []  Old records  []  Other:    Most notable findings include:     Allergies   Allergen Reactions    Penicillin G Rash       Current Medications:   Scheduled Meds:amiodarone, 200 mg, Oral, Q12H  apixaban, 5 mg, Oral, Q12H  atorvastatin, 40 mg, Oral, Nightly  busPIRone, 5 mg, Oral, Q12H  clopidogrel, 75 mg, Oral, Daily  donepezil, 5 mg, Oral, Nightly  melatonin, 5 mg, Oral, Nightly  memantine, 5 mg, Oral, BID  metoprolol succinate XL, 100 mg, Oral, Q24H  mupirocin, 1 Application, Each Nare, BID  sodium chloride, 10 mL, Intravenous, Q12H      Continuous Infusions:           Assessment:         Active Hospital Problems    Diagnosis  POA    Atrial flutter with rapid ventricular response [I48.92]  Unknown          Plan:   Paroxysmal SVT  paroxysmal A-fib  On Amio  gtt at 1mg/min, rates continue to be tachycardic  Consult EP  Restart home Toprol and Eliquis  Patient and significant other report uninterrupted Eliquis    CAD  MI age 28 with stents   Restart Plavix and Lipitor    Essential hypertension   Restart home metoprolol  mg daily, no home lisinopril at this time    Plan  Amio to 200 bid  BB cont with toprol  AC with eliquis  S/p DCCV  Ablation as OP  LHC at later date with recent NSTEMI here or with primary cardiology in [facility] recommended, cont plavix wihtout ASA now on AC    Has dementia significantly           Chris Spicer MD, PhD  Chris Spicer MD  04/28/25  07:46 EDT

## 2025-04-29 NOTE — OUTREACH NOTE
Prep Survey      Flowsheet Row Responses   [facility] facility patient discharged from? [facility]   Is LACE score < 7 ? No   Eligibility Readm Mgmt   Discharge diagnosis Arrhythmia   Does the patient have one of the following disease processes/diagnoses(primary or secondary)? Other   Prep survey completed? Yes            Brenda BOND - Registered Nurse

## 2025-04-29 NOTE — NURSING NOTE
Reminded pt. And family have Cardiology consultant in [place] in 2 weeks already. Pt. And family stated understood. Home heart monitor placed in pt.'s room by Holter monitor team.

## 2025-04-29 NOTE — CASE MANAGEMENT/SOCIAL WORK
Discharge Planning Assessment  [facility]     Patient Name: Kamar Scott  MRN: [id number]  Today's Date: 4/29/2025    Admit Date: 4/27/2025      LATE ENTRY - Completed on 4/28/2025 at 12:15    Plan: DC Plan: Anticipate routine home with spouse.   Discharge Needs Assessment       Row Name 04/29/25 0933       Living Environment    People in Home spouse    Name(s) of People in Home Jeanine Scott    Current Living Arrangements home    Potentially Unsafe Housing Conditions none    In the past 12 months has the electric, gas, oil, or water company threatened to shut off services in your home? No    Primary Care Provided by self    Provides Primary Care For no one    Family Caregiver if Needed spouse    Family Caregiver Names Jeanine Scott    Quality of Family Relationships helpful;involved;supportive    Able to Return to Prior Arrangements yes       Resource/Environmental Concerns    Resource/Environmental Concerns none    Transportation Concerns none       Transportation Needs    In the past 12 months, has lack of transportation kept you from medical appointments or from getting medications? no    In the past 12 months, has lack of transportation kept you from meetings, work, or from getting things needed for daily living? No       Food Insecurity    Within the past 12 months, you worried that your food would run out before you got the money to buy more. Never true    Within the past 12 months, the food you bought just didn't last and you didn't have money to get more. Never true       Transition Planning    Patient/Family Anticipates Transition to home with family    Patient/Family Anticipated Services at Transition none    Transportation Anticipated family or friend will provide       Discharge Needs Assessment    Readmission Within the Last 30 Days no previous admission in last 30 days    Equipment Currently Used at Home walker, rolling    Concerns to be Addressed discharge planning    Do you want help finding or  keeping work or a job? I do not need or want help    Do you want help with school or training? For example, starting or completing job training or getting a high school diploma, GED or equivalent No    Anticipated Changes Related to Illness none    Equipment Needed After Discharge none    Provided Post Acute Provider List? N/A    Provided Post Acute Provider Quality & Resource List? N/A                   Discharge Plan       Row Name 04/29/25 0933       Plan    Plan DC Plan: Anticipate routine home with spouse.    Patient/Family in Agreement with Plan yes    Plan Comments CM spoke with patient at bedside to discuss admission assessment and discharge planning. Patient confirms PCP and pharmacy. Patient has declined meds to bed program at this time. Patient denies any difficulty affording medications or food at this time. Patient denies any additional needs for services or DME at this time. Has RW at home. Patient reports independent with ADL's at a moderate level but does not drive. Patient spouse will provide transportation when ready for DC. CM reviewed chart documentation for clinical updates.CM will continue to follow for any further needs. DC Barriers: Cardiac monitoring, Amiodarone gtt, Pending echo, and monitor labs.                    Expected Discharge Date and Time       Expected Discharge Date Expected Discharge Time    Apr 29, 2025            Demographic Summary       Row Name 04/29/25 0932       General Information    Admission Type inpatient    Arrived From emergency department;home    Required Notices Provided Important Message from Medicare    Referral Source admission list    Reason for Consult discharge planning    Preferred Language English       Contact Information    Permission Granted to Share Info With                    Functional Status       Row Name 04/29/25 0932       Functional Status    Usual Activity Tolerance moderate    Current Activity Tolerance moderate       Physical  Activity    On average, how many days per week do you engage in moderate to strenuous exercise (like a brisk walk)? 0 days    On average, how many minutes do you engage in exercise at this level? 0 min    Number of minutes of exercise per week 0       Functional Status, IADL    Medications independent    Meal Preparation independent    Housekeeping independent    Laundry independent    Shopping independent    If for any reason you need help with day-to-day activities such as bathing, preparing meals, shopping, managing finances, etc., do you get the help you need? I get all the help I need       Mental Status    General Appearance WDL WDL       Mental Status Summary    Recent Changes in Mental Status/Cognitive Functioning no changes       Employment/    Employment Status disabled    Current or Previous Occupation not applicable                     Tricia Salas RN     Office Phone: [phone]  Office Cell:     [phone]

## 2025-05-05 NOTE — DISCHARGE SUMMARY
"             [facility] Medicine Services  Discharge Summary    Date of Service: 2025  Patient Name: Kamar Scott  : 1965  MRN: [id number]    Date of Admission: 2025  Discharge Diagnosis: Arrhythmia  Date of Discharge: 2025  Primary Care Physician: Buddy Lafleur MD      Presenting Problem:   Arrhythmia [I49.9]  SVT (supraventricular tachycardia) [I47.10]  Atrial fibrillation with rapid ventricular response [I48.91]    Active and Resolved Hospital Problems:  Active Hospital Problems    Diagnosis POA    Atrial flutter with rapid ventricular response [I48.92] Unknown      Resolved Hospital Problems    Diagnosis POA    **Arrhythmia [I49.9] Yes         Hospital Course     HPI:    \"Kamar Scott is a 60 y.o. male with a CMH of Alzheimer dementia, A-fib on anticoagulation, CAD status post stent x 2, hypertension, type 2 diabetes, who presented to [facility] on 2025 with fall.     Patient is currently awake, alert, conversational, patient is forgetful, wife present at bedside who helps with the history.  Per the wife, patient was walking over the ramp, lost balance, which led to the fall, denies any loss of consciousness.  Patient denies any chest pain shortness of breath, however did report having intermittent episodes of palpitations.     In ED, patient with tachycardia blood pressure stable, EKG showing tachycardia, QRS 92s, heart rate 200s, labs remarkable for creatinine of 1.38, patient received adenosine in the ED, was started on amnio, heart rate improved to 120s, CT of the left lower extremity showing no acute osseous normality, severe compartmental osteoarthritis with large knee joint effusion and few osseous loose bodies, chondrocalcinosis of the meniscus with medial extrusion and fragmentation of the calcified medial meniscus, Baker's cyst.  Patient now admitted to medicine for further inpatient management\"    Hospital Course:    60-year-old male patient who has " history of coronary artery disease, atrial flutter, previous cardioversion according to patient. He presented to the hospital due to swelling and pain in his left knee and he was found to be in atrial flutter heart rates going up to 200 bpm during one-to-one conduction episodes. Started on amiodarone and underwent KENIA cardioversion with patient returned to sinus rhythm. Patient to follow up with cardiology.        DISCHARGE Follow Up Recommendations for labs and diagnostics:     Cardiology  PCP        Day of Discharge     Vital Signs:       Physical Exam:  Physical Exam  Constitutional:       Appearance: Normal appearance.   Cardiovascular:      Rate and Rhythm: Normal rate and regular rhythm.      Pulses: Normal pulses.      Heart sounds: Normal heart sounds.   Pulmonary:      Effort: Pulmonary effort is normal.      Breath sounds: Normal breath sounds.   Abdominal:      General: Abdomen is flat.      Palpations: Abdomen is soft.   Neurological:      Mental Status: He is alert.          Pertinent  and/or Most Recent Results     LAB RESULTS:      Lab 04/28/25  0155   WBC 10.30   HEMOGLOBIN 12.1*   HEMATOCRIT 35.3*   PLATELETS 147   NEUTROS ABS 6.42   IMMATURE GRANS (ABS) 0.02   LYMPHS ABS 2.53   MONOS ABS 1.28*   EOS ABS 0.02   MCV 91.9         Lab 04/28/25  2332 04/28/25  1414 04/28/25  0155   SODIUM  --   --  140   POTASSIUM 3.9 3.2* 3.6   CHLORIDE  --   --  104   CO2  --   --  24.1   ANION GAP  --   --  11.9   BUN  --   --  17   CREATININE  --   --  1.29*   EGFR  --   --  64.3   GLUCOSE  --   --  175*   CALCIUM  --   --  9.0   MAGNESIUM  --   --  1.7   PHOSPHORUS  --   --  2.5         Lab 04/28/25  0155   TOTAL PROTEIN 7.2   ALBUMIN 4.2   GLOBULIN 3.0   ALT (SGPT) 12   AST (SGOT) 15   BILIRUBIN 0.8   ALK PHOS 87                     Brief Urine Lab Results  (Last result in the past 365 days)        Color   Clarity   Blood   Leuk Est   Nitrite   Protein   CREAT   Urine HCG        04/27/25 2146 Yellow   Clear    Negative   Negative   Negative   30 mg/dL (1+)                 Microbiology Results (last 10 days)       ** No results found for the last 240 hours. **            CT Head Without Contrast  Result Date: 4/28/2025  Impression: Impression: No acute intracranial abnormality. Electronically Signed: Reji Sesay MD  4/28/2025 1:29 AM EDT  Workstation ID: [id number]    CT Lower Extremity Left Without Contrast  Result Date: 4/27/2025  Impression: Impression: 1.No acute osseous abnormality. 2.Severe tricompartmental osteoarthritis with large knee joint effusion and a few osseous loose bodies. 3.Chondrocalcinosis of the menisci with medial extrusion and fragmentation of the calcified medial meniscus. 4.Baker's cyst. Electronically Signed: Reji Sesay MD  4/27/2025 8:37 PM EDT  Workstation ID: [id number]    XR Knee 3 View Left  Result Date: 4/27/2025  Impression: Impression: 1.Large knee joint effusion. Question irregularity along the superior pole of the patella may represent nondisplaced fracture. Recommend correlation with point tenderness. 2.Moderate degenerative changes of the knee with multiple loose bodies. Electronically Signed: Pedro Carson MD  4/27/2025 6:29 PM EDT  Workstation ID: [id number]              Results for orders placed during the hospital encounter of 04/27/25    Adult Transthoracic Echo Complete W/ Cont if Necessary Per Protocol    Interpretation Summary    Left ventricular systolic function is normal. Left ventricular ejection fraction appears to be 56 - 60%.    Left ventricular wall thickness is consistent with mild concentric hypertrophy.    Left ventricular diastolic dysfunction is noted.    The right ventricular cavity is mildly dilated.    The left atrial cavity is mildly dilated.    Estimated right ventricular systolic pressure from tricuspid regurgitation is normal (<35 mmHg).      Labs Pending at Discharge:  Pending Results       Procedure [Order ID] Specimen - Date/Time    Adult  Transesophageal Echo (KENIA) W/ Cont if Necessary Per Protocol (Cardiology Department) [[id number]] Resulted: 04/28/25 1703     Updated: 04/28/25 1703    Cardiac Event Monitor (JESS) or Mobile Cardiac Outpatient Telemetry (MCT) [[id number]] Resulted: 04/29/25 1211     Updated: 04/29/25 1325            Procedures Performed           Consults:   Consults       Date and Time Order Name Status Description    4/28/2025 12:28 PM Inpatient Psychiatrist Consult Completed     4/27/2025 10:51 PM Cardiology (on-call MD unless specified) Completed               Discharge Details        Discharge Medications        New Medications        Instructions Start Date   amiodarone 200 MG tablet  Commonly known as: PACERONE   200 mg, Oral, Every 12 Hours Scheduled             Changes to Medications        Instructions Start Date   metoprolol succinate  MG 24 hr tablet  Commonly known as: TOPROL-XL  What changed: when to take this   100 mg, Oral, Every 24 Hours Scheduled             Continue These Medications        Instructions Start Date   apixaban 5 MG tablet tablet  Commonly known as: ELIQUIS   5 mg, Every 12 Hours Scheduled      aspirin 81 MG EC tablet   1 tablet, Daily      atorvastatin 40 MG tablet  Commonly known as: LIPITOR   40 mg, Daily      clopidogrel 75 MG tablet  Commonly known as: PLAVIX   75 mg, Daily      donepezil 5 MG tablet  Commonly known as: ARICEPT   5 mg, Nightly      DULoxetine 60 MG capsule  Commonly known as: CYMBALTA   60 mg, Daily      gabapentin 300 MG capsule  Commonly known as: NEURONTIN   300 mg, 2 Times Daily      Gemtesa 75 MG tablet  Generic drug: Vibegron   1 tablet, Daily      HYDROcodone-acetaminophen  MG per tablet  Commonly known as: NORCO   TAKE 1 TABLET BY MOUTH EVERY 6 HOURS AS NEEDED FOR 30 DAYS      LORazepam 1 MG tablet  Commonly known as: ATIVAN   TAKE 1 TABLET EVERY DAY BY MOUTH AS NEEDED FOR 15 DAYS, FOR ANXIETY.      memantine 5 MG tablet  Commonly known as: NAMENDA   5 mg, Oral,  2 Times Daily      multivitamin with minerals tablet  Generic drug: multivitamin with minerals   1 tablet, Daily      risperiDONE 0.5 MG tablet  Commonly known as: risperDAL   take 1 tablet by mouth twice a day as directed      tamsulosin 0.4 MG capsule 24 hr capsule  Commonly known as: FLOMAX   1 capsule, 2 Times Daily             Stop These Medications      dilTIAZem  MG 24 hr capsule  Commonly known as: CARDIZEM CD     lisinopril 5 MG tablet  Commonly known as: PRINIVIL,ZESTRIL              Allergies   Allergen Reactions    Penicillin G Rash         Discharge Disposition:   Home or Self Care    Diet:  Hospital:No active diet order        Discharge Activity:         CODE STATUS:  Code Status and Medical Interventions: CPR (Attempt to Resuscitate); Full Support   Ordered at: 04/28/25 0009     Code Status (Patient has no pulse and is not breathing):    CPR (Attempt to Resuscitate)     Medical Interventions (Patient has pulse or is breathing):    Full Support         No future appointments.        Time spent on Discharge including face to face service:  58 minutes    Signature: Electronically signed by Paul Elliott MD, 05/04/25, 20:55 EDT.  [facility] Hospitalist Team

## 2025-05-06 ENCOUNTER — READMISSION MANAGEMENT (OUTPATIENT)
Dept: CALL CENTER | Facility: HOSPITAL | Age: 60
End: 2025-05-06
Payer: MEDICARE

## 2025-05-06 NOTE — OUTREACH NOTE
Medical Week 1 Survey      Flowsheet Row Responses   [facility] patient discharged from? [facility]   Does the patient have one of the following disease processes/diagnoses(primary or secondary)? Other   Week 1 attempt successful? Yes   Call start time 1451   Call end time 1457   Discharge diagnosis Arrhythmia, atrial flutter with RVR   Is patient permission given to speak with other caregiver? Yes   Person spoke with today (if not patient) and relationship Jeanine, spouse   Meds reviewed with patient/caregiver? Yes   Does the patient have all medications ordered at discharge? Yes   Is the patient taking all medications as directed (includes completed medication regime)? Yes   Does the patient have a primary care provider?  Yes   Does the patient have an appointment with their PCP within 7 days of discharge? No   Comments regarding PCP Follow up with Buddy MARTINEZ   What is preventing the patient from scheduling follow up appointments within 7 days of discharge? Waiting on return call   Has the patient kept scheduled appointments due by today? N/A   Comments Patient to also follow up with cardiology.   Has home health visited the patient within 72 hours of discharge? N/A   Psychosocial issues? No   Did the patient receive a copy of their discharge instructions? Yes   Nursing interventions Reviewed instructions with patient  [wife]   What is the patient's perception of their health status since discharge? Improving   Is the patient/caregiver able to teach back signs and symptoms related to disease process for when to call PCP? Yes   Is the patient/caregiver able to teach back signs and symptoms related to disease process for when to call 911? Yes   Is the patient/caregiver able to teach back the hierarchy of who to call/visit for symptoms/problems? PCP, Specialist, Home health nurse, Urgent Care, ED, 911 Yes   Week 1 call completed? Yes   Would this patient benefit from a Referral to [facility] Social Work? No   Is the  patient interested in additional calls from an ambulatory ? No   Call end time [id number]            RACHEL FARLEY - Registered Nurse

## 2025-05-16 ENCOUNTER — READMISSION MANAGEMENT (OUTPATIENT)
Dept: CALL CENTER | Facility: HOSPITAL | Age: 60
End: 2025-05-16
Payer: MEDICARE

## 2025-05-16 NOTE — OUTREACH NOTE
Medical Week 2 Survey      Flowsheet Row Responses   [facility] patient discharged from? [facility]   Does the patient have one of the following disease processes/diagnoses(primary or secondary)? Other   Week 2 attempt successful? Yes   Call start time 1036   Discharge diagnosis Arrhythmia, atrial flutter with RVR   Call end time 1036   Person spoke with today (if not patient) and relationship Jeanine, spouse   Does the patient have a primary care provider?  Yes   Comments regarding PCP Has seen cards since DC   Did the patient receive a copy of their discharge instructions? Yes   Nursing interventions Reviewed instructions with patient   What is the patient's perception of their health status since discharge? Improving   Is the patient/caregiver able to teach back signs and symptoms related to disease process for when to call PCP? Yes   Is the patient/caregiver able to teach back signs and symptoms related to disease process for when to call 911? Yes   Is the patient/caregiver able to teach back the hierarchy of who to call/visit for symptoms/problems? PCP, Specialist, Home health nurse, Urgent Care, ED, 911 Yes   Week 2 Call Completed? Yes   Graduated Yes   Wrap up additional comments Spouse states patient is doing well no concerns or questions noted.   Call end time 1036            Brenda BOND - Registered Nurse

## 2025-07-12 DIAGNOSIS — G30.9 ALZHEIMER DISEASE: ICD-10-CM

## 2025-07-12 DIAGNOSIS — F02.80 ALZHEIMER DISEASE: ICD-10-CM

## 2025-07-16 RX ORDER — MEMANTINE HYDROCHLORIDE 5 MG/1
5 TABLET ORAL 2 TIMES DAILY
Qty: 60 TABLET | Refills: 2 | Status: SHIPPED | OUTPATIENT
Start: 2025-07-16

## 2025-08-26 RX ORDER — DONEPEZIL HYDROCHLORIDE 5 MG/1
5 TABLET, FILM COATED ORAL NIGHTLY
Qty: 30 TABLET | Refills: 5 | Status: SHIPPED | OUTPATIENT
Start: 2025-08-26